# Patient Record
Sex: FEMALE | Race: WHITE | NOT HISPANIC OR LATINO | Employment: OTHER | URBAN - METROPOLITAN AREA
[De-identification: names, ages, dates, MRNs, and addresses within clinical notes are randomized per-mention and may not be internally consistent; named-entity substitution may affect disease eponyms.]

---

## 2017-01-26 ENCOUNTER — HISTORICAL (OUTPATIENT)
Dept: LAB | Facility: HOSPITAL | Age: 67
End: 2017-01-26

## 2017-04-24 ENCOUNTER — HISTORICAL (OUTPATIENT)
Dept: LAB | Facility: HOSPITAL | Age: 67
End: 2017-04-24

## 2017-05-23 ENCOUNTER — HISTORICAL (OUTPATIENT)
Dept: LAB | Facility: HOSPITAL | Age: 67
End: 2017-05-23

## 2017-05-23 LAB
BILIRUB SERPL-MCNC: NEGATIVE MG/DL
BLOOD URINE, POC: NEGATIVE
CLARITY, POC UA: CLEAR
COLOR, POC UA: YELLOW
GLUCOSE UR QL STRIP: NEGATIVE
KETONES UR QL STRIP: NEGATIVE
LEUKOCYTE EST, POC UA: NORMAL
NITRITE, POC UA: NEGATIVE
PH, POC UA: 5
PROTEIN, POC: NEGATIVE
SPECIFIC GRAVITY, POC UA: 1.02
UROBILINOGEN, POC UA: NORMAL

## 2017-05-25 LAB — CRC RECOMMENDATION EXT: NORMAL

## 2017-08-08 LAB
BUN SERPL-MCNC: 200 MG/DL (ref 7–18)
CALCIUM SERPL-MCNC: 8.9 MG/DL (ref 9–10.9)
CHLORIDE SERPL-SCNC: 101 MMOL/L (ref 98–116)
CO2 SERPL-SCNC: 28 MMOL/L (ref 15–28)
CREAT SERPL-MCNC: 0.9 MG/DL (ref 0.6–1.3)
GLUCOSE SERPL-MCNC: 87 MG/DL (ref 74–106)
POTASSIUM SERPL-SCNC: 4.5 MMOL/L (ref 3.5–5.1)
SODIUM SERPL-SCNC: 143 MEQ/L (ref 131–145)

## 2017-08-22 ENCOUNTER — HISTORICAL (OUTPATIENT)
Dept: LAB | Facility: HOSPITAL | Age: 67
End: 2017-08-22

## 2017-08-22 LAB — TSH SERPL-ACNC: 0.31 MIU/ML (ref 0.35–3.75)

## 2017-09-01 ENCOUNTER — HISTORICAL (OUTPATIENT)
Dept: RADIOLOGY | Facility: HOSPITAL | Age: 67
End: 2017-09-01

## 2017-09-11 ENCOUNTER — HISTORICAL (OUTPATIENT)
Dept: RADIOLOGY | Facility: HOSPITAL | Age: 67
End: 2017-09-11

## 2017-10-10 ENCOUNTER — HISTORICAL (OUTPATIENT)
Dept: RADIOLOGY | Facility: HOSPITAL | Age: 67
End: 2017-10-10

## 2017-10-10 LAB
ALBUMIN SERPL-MCNC: 3.5 GM/DL (ref 3.4–5)
ALBUMIN/GLOB SERPL: 1 RATIO (ref 1.1–2)
ALP SERPL-CCNC: 90 UNIT/L (ref 50–136)
ALT SERPL-CCNC: 18 UNIT/L (ref 12–78)
AST SERPL-CCNC: 11 UNIT/L (ref 10–37)
BILIRUB SERPL-MCNC: 0.5 MG/DL (ref 0.2–1)
BILIRUBIN DIRECT+TOT PNL SERPL-MCNC: 0.09 MG/DL (ref 0.05–0.2)
BILIRUBIN DIRECT+TOT PNL SERPL-MCNC: 0.43 MG/DL
BUN SERPL-MCNC: 16 MG/DL (ref 7–18)
CALCIUM SERPL-MCNC: 8.7 MG/DL (ref 8.5–10.1)
CHLORIDE SERPL-SCNC: 106 MMOL/L (ref 98–107)
CHOLEST SERPL-MCNC: 163 MG/DL (ref 50–200)
CHOLEST/HDLC SERPL: 3 {RATIO} (ref 0–5)
CO2 SERPL-SCNC: 30.5 MMOL/L (ref 21–32)
CREAT SERPL-MCNC: 0.87 MG/DL (ref 0.55–1.02)
GLOBULIN SER-MCNC: 3.6 GM/DL (ref 2.4–3.5)
GLUCOSE SERPL-MCNC: 91 MG/DL (ref 74–106)
HDLC SERPL-MCNC: 49 MG/DL (ref 35–60)
LDLC SERPL CALC-MCNC: 70 MG/DL (ref 50–140)
POTASSIUM SERPL-SCNC: 4.6 MMOL/L (ref 3.5–5.1)
PROT SERPL-MCNC: 7.1 GM/DL (ref 6.4–8.2)
SODIUM SERPL-SCNC: 143 MMOL/L (ref 136–145)
TRIGL SERPL-MCNC: 220 MG/DL (ref 30–150)
TSH SERPL-ACNC: 0.31 MIU/ML (ref 0.35–3.75)
VLDLC SERPL CALC-MCNC: 44 MG/DL

## 2017-11-13 LAB
BILIRUB SERPL-MCNC: NEGATIVE MG/DL
BLOOD URINE, POC: NEGATIVE
CLARITY, POC UA: CLEAR
COLOR, POC UA: YELLOW
GLUCOSE UR QL STRIP: NEGATIVE
KETONES UR QL STRIP: NEGATIVE
LEUKOCYTE EST, POC UA: NORMAL
NITRITE, POC UA: NEGATIVE
PH, POC UA: 5
PROTEIN, POC: NEGATIVE
SPECIFIC GRAVITY, POC UA: 1
UROBILINOGEN, POC UA: NORMAL

## 2017-11-16 ENCOUNTER — HISTORICAL (OUTPATIENT)
Dept: LAB | Facility: HOSPITAL | Age: 67
End: 2017-11-16

## 2017-11-16 LAB — TSH SERPL-ACNC: 10.45 MIU/ML (ref 0.35–3.75)

## 2017-11-18 LAB — FINAL CULTURE: NO GROWTH

## 2018-01-05 ENCOUNTER — HISTORICAL (OUTPATIENT)
Dept: LAB | Facility: HOSPITAL | Age: 68
End: 2018-01-05

## 2018-01-05 LAB
ABS NEUT (OLG): 3.94
BASOPHILS # BLD AUTO: 0.06 X10(3)/MCL
BASOPHILS NFR BLD AUTO: 1 %
EOSINOPHIL # BLD AUTO: 0.04 X10(3)/MCL
EOSINOPHIL NFR BLD AUTO: 0.7 %
ERYTHROCYTE [DISTWIDTH] IN BLOOD BY AUTOMATED COUNT: 16 %
HCT VFR BLD AUTO: 34.2 % (ref 34–46)
HGB BLD-MCNC: 10.7 GM/DL (ref 11.3–15.4)
IMM GRANULOCYTES # BLD AUTO: 0.01 10*3/UL (ref 0–0.1)
IMM GRANULOCYTES NFR BLD AUTO: 0.2 % (ref 0–1)
LYMPHOCYTES # BLD AUTO: 1.55 X10(3)/MCL
LYMPHOCYTES NFR BLD AUTO: 25.5 %
MCH RBC QN AUTO: 18.7 PG (ref 27–33)
MCHC RBC AUTO-ENTMCNC: 31.3 GM/DL (ref 32–35)
MCV RBC AUTO: 59.7 FL (ref 81–97)
MONOCYTES # BLD AUTO: 0.48 X10(3)/MCL
MONOCYTES NFR BLD AUTO: 7.9 %
NEUTROPHILS # BLD AUTO: 3.94 X10(3)/MCL
NEUTROPHILS NFR BLD AUTO: 64.7 %
PLATELET # BLD AUTO: 354 X10(3)/MCL (ref 151–368)
PMV BLD AUTO: 11 FL
RBC # BLD AUTO: 5.73 X10(6)/MCL (ref 3.9–5)
TSH SERPL-ACNC: 1.21 MIU/ML (ref 0.35–3.75)
WBC # SPEC AUTO: 6.08 X10(3)/MCL (ref 3.4–9.2)

## 2018-02-20 ENCOUNTER — HISTORICAL (OUTPATIENT)
Dept: LAB | Facility: HOSPITAL | Age: 68
End: 2018-02-20

## 2018-02-20 LAB — TSH SERPL-ACNC: 2.81 MIU/ML (ref 0.35–3.75)

## 2018-04-16 ENCOUNTER — HISTORICAL (OUTPATIENT)
Dept: LAB | Facility: HOSPITAL | Age: 68
End: 2018-04-16

## 2018-04-16 LAB
BILIRUB SERPL-MCNC: NEGATIVE MG/DL
BLOOD URINE, POC: NORMAL
CLARITY, POC UA: CLEAR
COLOR, POC UA: YELLOW
GLUCOSE UR QL STRIP: NEGATIVE
KETONES UR QL STRIP: NEGATIVE
LEUKOCYTE EST, POC UA: NORMAL
NITRITE, POC UA: NEGATIVE
PH, POC UA: 5
PROTEIN, POC: NEGATIVE
SPECIFIC GRAVITY, POC UA: 1.01
UROBILINOGEN, POC UA: NORMAL

## 2018-06-14 ENCOUNTER — HISTORICAL (OUTPATIENT)
Dept: LAB | Facility: HOSPITAL | Age: 68
End: 2018-06-14

## 2018-06-14 LAB
BILIRUB SERPL-MCNC: NEGATIVE MG/DL
BLOOD URINE, POC: NEGATIVE
CLARITY, POC UA: CLEAR
COLOR, POC UA: COLORLESS
GLUCOSE UR QL STRIP: NEGATIVE
KETONES UR QL STRIP: NEGATIVE
LEUKOCYTE EST, POC UA: NORMAL
NITRITE, POC UA: NEGATIVE
PH, POC UA: 6
PROTEIN, POC: NEGATIVE
SPECIFIC GRAVITY, POC UA: 1
UROBILINOGEN, POC UA: NORMAL

## 2018-07-03 ENCOUNTER — HISTORICAL (OUTPATIENT)
Dept: LAB | Facility: HOSPITAL | Age: 68
End: 2018-07-03

## 2018-07-03 LAB — TSH SERPL-ACNC: 0.62 MIU/ML (ref 0.35–3.75)

## 2018-07-17 ENCOUNTER — HISTORICAL (OUTPATIENT)
Dept: RESPIRATORY THERAPY | Facility: HOSPITAL | Age: 68
End: 2018-07-17

## 2018-07-27 ENCOUNTER — HISTORICAL (OUTPATIENT)
Dept: RADIOLOGY | Facility: HOSPITAL | Age: 68
End: 2018-07-27

## 2018-07-27 LAB
FT4I SERPL CALC-MCNC: 5 (ref 3–4)
T3FREE SERPL-MCNC: 2.76 PG/ML (ref 2.18–3.98)
T3RU NFR SERPL: 33 % (ref 31–39)
T4 FREE SERPL-MCNC: 1.25 NG/DL (ref 0.76–1.46)
T4 SERPL-MCNC: 14.9 MCG/DL (ref 4.7–13.3)
TSH SERPL-ACNC: 0.85 MIU/L (ref 0.36–3.74)

## 2018-08-20 LAB
BILIRUB SERPL-MCNC: NEGATIVE MG/DL
BLOOD URINE, POC: NORMAL
CLARITY, POC UA: CLEAR
COLOR, POC UA: YELLOW
GLUCOSE UR QL STRIP: NEGATIVE
KETONES UR QL STRIP: NEGATIVE
LEUKOCYTE EST, POC UA: NORMAL
NITRITE, POC UA: NEGATIVE
PH, POC UA: 5
PROTEIN, POC: NEGATIVE
SPECIFIC GRAVITY, POC UA: 1.02
UROBILINOGEN, POC UA: NORMAL

## 2018-09-13 ENCOUNTER — HISTORICAL (OUTPATIENT)
Dept: LAB | Facility: HOSPITAL | Age: 68
End: 2018-09-13

## 2018-09-13 LAB
ABS NEUT (OLG): 3.94
ALBUMIN SERPL-MCNC: 3.4 GM/DL (ref 3.4–5)
ALBUMIN/GLOB SERPL: 1 RATIO (ref 1.1–2)
ALP SERPL-CCNC: 82 UNIT/L (ref 46–116)
ALT SERPL-CCNC: 15 UNIT/L (ref 12–78)
AST SERPL-CCNC: 12 UNIT/L (ref 10–37)
BASOPHILS # BLD AUTO: 0.08 X10(3)/MCL
BASOPHILS NFR BLD AUTO: 1.4 %
BILIRUB SERPL-MCNC: 0.6 MG/DL (ref 0.2–1)
BILIRUBIN DIRECT+TOT PNL SERPL-MCNC: 0.12 MG/DL (ref 0–0.2)
BILIRUBIN DIRECT+TOT PNL SERPL-MCNC: 0.48 MG/DL
BUN SERPL-MCNC: 17 MG/DL (ref 7–18)
CALCIUM SERPL-MCNC: 8.4 MG/DL (ref 8.5–10.1)
CHLORIDE SERPL-SCNC: 105 MMOL/L (ref 98–107)
CO2 SERPL-SCNC: 28.7 MMOL/L (ref 21–32)
CREAT SERPL-MCNC: 0.88 MG/DL (ref 0.55–1.02)
EOSINOPHIL # BLD AUTO: 0.1 X10(3)/MCL
EOSINOPHIL NFR BLD AUTO: 1.7 %
ERYTHROCYTE [DISTWIDTH] IN BLOOD BY AUTOMATED COUNT: 18 %
ERYTHROCYTE [SEDIMENTATION RATE] IN BLOOD: 23 MM/HR (ref 0–20)
GLOBULIN SER-MCNC: 3.5 GM/DL (ref 2.4–3.5)
GLUCOSE SERPL-MCNC: 82 MG/DL (ref 74–106)
HCT VFR BLD AUTO: 35.7 % (ref 34–46)
HGB BLD-MCNC: 11.1 GM/DL (ref 11.3–15.4)
IMM GRANULOCYTES # BLD AUTO: 0.02 10*3/UL (ref 0–0.1)
IMM GRANULOCYTES NFR BLD AUTO: 0.3 % (ref 0–1)
LYMPHOCYTES # BLD AUTO: 1.16 X10(3)/MCL
LYMPHOCYTES NFR BLD AUTO: 20.2 %
MAGNESIUM SERPL-MCNC: 1.9 MG/DL (ref 1.8–2.4)
MCH RBC QN AUTO: 18.4 PG (ref 27–33)
MCHC RBC AUTO-ENTMCNC: 31.1 GM/DL (ref 32–35)
MCV RBC AUTO: 59.1 FL (ref 81–97)
MONOCYTES # BLD AUTO: 0.43 X10(3)/MCL
MONOCYTES NFR BLD AUTO: 7.5 %
NEUTROPHILS # BLD AUTO: 3.94 X10(3)/MCL
NEUTROPHILS NFR BLD AUTO: 68.9 %
PLATELET # BLD AUTO: 377 X10(3)/MCL (ref 151–368)
PMV BLD AUTO: 10 FL
POTASSIUM SERPL-SCNC: 4.6 MMOL/L (ref 3.5–5.1)
PROT SERPL-MCNC: 6.9 GM/DL (ref 6.4–8.2)
RBC # BLD AUTO: 6.04 X10(6)/MCL (ref 3.9–5)
SODIUM SERPL-SCNC: 142 MMOL/L (ref 136–145)
WBC # SPEC AUTO: 5.73 X10(3)/MCL (ref 3.4–9.2)

## 2018-10-05 ENCOUNTER — HISTORICAL (OUTPATIENT)
Dept: LAB | Facility: HOSPITAL | Age: 68
End: 2018-10-05

## 2018-10-07 LAB — FINAL CULTURE: NO GROWTH

## 2018-11-01 ENCOUNTER — HISTORICAL (OUTPATIENT)
Dept: LAB | Facility: HOSPITAL | Age: 68
End: 2018-11-01

## 2018-11-03 LAB — FINAL CULTURE: NORMAL

## 2018-12-14 ENCOUNTER — HISTORICAL (OUTPATIENT)
Dept: LAB | Facility: HOSPITAL | Age: 68
End: 2018-12-14

## 2018-12-14 LAB
ABS NEUT (OLG): 5.15
ALBUMIN SERPL-MCNC: 3.4 GM/DL (ref 3.4–5)
ALBUMIN/GLOB SERPL: 0.9 RATIO (ref 1.1–2)
ALP SERPL-CCNC: 113 UNIT/L (ref 46–116)
ALT SERPL-CCNC: 23 UNIT/L (ref 12–78)
AST SERPL-CCNC: 18 UNIT/L (ref 10–37)
BASOPHILS # BLD AUTO: 0.1 X10(3)/MCL
BASOPHILS NFR BLD AUTO: 1.4 %
BILIRUB SERPL-MCNC: 0.3 MG/DL (ref 0.2–1)
BILIRUBIN DIRECT+TOT PNL SERPL-MCNC: 0.07 MG/DL (ref 0–0.2)
BILIRUBIN DIRECT+TOT PNL SERPL-MCNC: 0.23 MG/DL
BUN SERPL-MCNC: 17 MG/DL (ref 7–18)
CALCIUM SERPL-MCNC: 8.3 MG/DL (ref 8.5–10.1)
CHLORIDE SERPL-SCNC: 105 MMOL/L (ref 98–107)
CHOLEST SERPL-MCNC: 201 MG/DL (ref 50–200)
CHOLEST/HDLC SERPL: 5 {RATIO} (ref 0–5)
CO2 SERPL-SCNC: 31.2 MMOL/L (ref 21–32)
CREAT SERPL-MCNC: 1.01 MG/DL (ref 0.55–1.02)
EOSINOPHIL # BLD AUTO: 0.11 X10(3)/MCL
EOSINOPHIL NFR BLD AUTO: 1.5 %
ERYTHROCYTE [DISTWIDTH] IN BLOOD BY AUTOMATED COUNT: 16 %
GLOBULIN SER-MCNC: 3.6 GM/DL (ref 2.4–3.5)
GLUCOSE SERPL-MCNC: 94 MG/DL (ref 74–106)
HCT VFR BLD AUTO: 36.6 % (ref 34–46)
HDLC SERPL-MCNC: 41 MG/DL (ref 35–60)
HGB BLD-MCNC: 11 GM/DL (ref 11.3–15.4)
HYPOCHROMIA BLD QL SMEAR: ABNORMAL
IMM GRANULOCYTES # BLD AUTO: 0.03 10*3/UL (ref 0–0.1)
IMM GRANULOCYTES NFR BLD AUTO: 0.4 % (ref 0–1)
LDLC SERPL CALC-MCNC: ABNORMAL MG/DL (ref 50–140)
LYMPHOCYTES # BLD AUTO: 1.4 X10(3)/MCL
LYMPHOCYTES NFR BLD AUTO: 19.3 %
MCH RBC QN AUTO: 18.3 PG (ref 27–33)
MCHC RBC AUTO-ENTMCNC: 30.1 GM/DL (ref 32–35)
MCV RBC AUTO: 60.9 FL (ref 81–97)
MICROCYTES BLD QL SMEAR: ABNORMAL
MONOCYTES # BLD AUTO: 0.48 X10(3)/MCL
MONOCYTES NFR BLD AUTO: 6.6 %
NEUTROPHILS # BLD AUTO: 5.15 X10(3)/MCL
NEUTROPHILS NFR BLD AUTO: 70.8 %
PLATELET # BLD AUTO: 364 X10(3)/MCL (ref 151–368)
PLATELET # BLD EST: ABNORMAL 10*3/UL
PMV BLD AUTO: 10 FL
POTASSIUM SERPL-SCNC: 4.6 MMOL/L (ref 3.5–5.1)
PROT SERPL-MCNC: 7 GM/DL (ref 6.4–8.2)
RBC # BLD AUTO: 6.01 X10(6)/MCL (ref 3.9–5)
SODIUM SERPL-SCNC: 141 MMOL/L (ref 136–145)
TRIGL SERPL-MCNC: 437 MG/DL (ref 30–150)
TSH SERPL-ACNC: 21.43 MIU/ML (ref 0.35–3.75)
VLDLC SERPL CALC-MCNC: 87 MG/DL
WBC # SPEC AUTO: 7.27 X10(3)/MCL (ref 3.4–9.2)

## 2019-02-06 ENCOUNTER — HISTORICAL (OUTPATIENT)
Dept: LAB | Facility: HOSPITAL | Age: 69
End: 2019-02-06

## 2019-02-06 LAB
T3FREE SERPL-MCNC: 2.61 PG/ML (ref 2.18–3.98)
TSH SERPL-ACNC: 0.15 MIU/ML (ref 0.35–3.75)

## 2019-04-01 ENCOUNTER — HISTORICAL (OUTPATIENT)
Dept: LAB | Facility: HOSPITAL | Age: 69
End: 2019-04-01

## 2019-04-01 LAB — TSH SERPL-ACNC: 0.09 MIU/ML (ref 0.35–3.75)

## 2019-07-03 ENCOUNTER — HISTORICAL (OUTPATIENT)
Dept: LAB | Facility: HOSPITAL | Age: 69
End: 2019-07-03

## 2019-07-03 LAB
BILIRUB SERPL-MCNC: NEGATIVE MG/DL
BLOOD URINE, POC: NORMAL
CLARITY, POC UA: CLEAR
COLOR, POC UA: YELLOW
GLUCOSE UR QL STRIP: NEGATIVE
KETONES UR QL STRIP: NEGATIVE
LEUKOCYTE EST, POC UA: NORMAL
NITRITE, POC UA: NEGATIVE
PH, POC UA: 5
PROTEIN, POC: NEGATIVE
SPECIFIC GRAVITY, POC UA: 1.02
TSH SERPL-ACNC: 0.47 MIU/ML (ref 0.35–3.75)
UROBILINOGEN, POC UA: NORMAL

## 2019-08-27 ENCOUNTER — HISTORICAL (OUTPATIENT)
Dept: LAB | Facility: HOSPITAL | Age: 69
End: 2019-08-27

## 2019-08-27 LAB
BILIRUB SERPL-MCNC: NEGATIVE MG/DL
BLOOD URINE, POC: NEGATIVE
CLARITY, POC UA: CLEAR
COLOR, POC UA: YELLOW
GLUCOSE UR QL STRIP: NEGATIVE
KETONES UR QL STRIP: NEGATIVE
LEUKOCYTE EST, POC UA: NORMAL
NITRITE, POC UA: NEGATIVE
PH, POC UA: 5
PROTEIN, POC: NEGATIVE
SPECIFIC GRAVITY, POC UA: 1
TSH SERPL-ACNC: 15.71 MIU/ML (ref 0.35–3.75)
UROBILINOGEN, POC UA: NORMAL

## 2019-08-29 LAB — FINAL CULTURE: NO GROWTH

## 2019-10-01 ENCOUNTER — HISTORICAL (OUTPATIENT)
Dept: LAB | Facility: HOSPITAL | Age: 69
End: 2019-10-01

## 2019-10-01 LAB
BILIRUB SERPL-MCNC: NEGATIVE MG/DL
BLOOD URINE, POC: NORMAL
CLARITY, POC UA: CLEAR
COLOR, POC UA: COLORLESS
GLUCOSE UR QL STRIP: NEGATIVE
KETONES UR QL STRIP: NEGATIVE
LEUKOCYTE EST, POC UA: NORMAL
NITRITE, POC UA: NEGATIVE
PH, POC UA: 5
PROTEIN, POC: NEGATIVE
SPECIFIC GRAVITY, POC UA: 1.01
UROBILINOGEN, POC UA: NORMAL

## 2019-10-02 ENCOUNTER — HISTORICAL (OUTPATIENT)
Dept: LAB | Facility: HOSPITAL | Age: 69
End: 2019-10-02

## 2019-10-02 LAB — TSH SERPL-ACNC: 3.73 MIU/ML (ref 0.35–3.75)

## 2019-12-04 ENCOUNTER — HISTORICAL (OUTPATIENT)
Dept: RADIOLOGY | Facility: HOSPITAL | Age: 69
End: 2019-12-04

## 2019-12-04 LAB
BILIRUB SERPL-MCNC: NEGATIVE MG/DL
BLOOD URINE, POC: NORMAL
CLARITY, POC UA: CLEAR
COLOR, POC UA: YELLOW
GLUCOSE UR QL STRIP: NEGATIVE
KETONES UR QL STRIP: NEGATIVE
LEUKOCYTE EST, POC UA: NORMAL
NITRITE, POC UA: NEGATIVE
PH, POC UA: 5
PROTEIN, POC: NEGATIVE
SPECIFIC GRAVITY, POC UA: 1.01
TSH SERPL-ACNC: 0.36 UIU/ML (ref 0.35–4.94)
UROBILINOGEN, POC UA: NORMAL

## 2019-12-07 LAB — FINAL CULTURE: NO GROWTH

## 2020-01-15 ENCOUNTER — HISTORICAL (OUTPATIENT)
Dept: LAB | Facility: HOSPITAL | Age: 70
End: 2020-01-15

## 2020-01-18 LAB — FINAL CULTURE: NORMAL

## 2020-01-23 ENCOUNTER — HISTORICAL (OUTPATIENT)
Dept: RADIOLOGY | Facility: HOSPITAL | Age: 70
End: 2020-01-23

## 2020-01-23 LAB
ABS NEUT (OLG): 5.09
ALBUMIN SERPL-MCNC: 3.4 GM/DL (ref 3.2–4.6)
ALBUMIN/GLOB SERPL: 1.1 RATIO (ref 1.1–2)
ALP SERPL-CCNC: 76 UNIT/L (ref 40–150)
ALT SERPL-CCNC: 15 UNIT/L (ref 0–55)
ANISOCYTOSIS BLD QL SMEAR: ABNORMAL
AST SERPL-CCNC: 18 UNIT/L (ref 5–34)
BASOPHILS # BLD AUTO: 0.08 X10(3)/MCL
BASOPHILS NFR BLD AUTO: 1 %
BILIRUB SERPL-MCNC: 0.6 MG/DL
BILIRUBIN DIRECT+TOT PNL SERPL-MCNC: 0.2 MG/DL (ref 0–0.5)
BILIRUBIN DIRECT+TOT PNL SERPL-MCNC: 0.4 MG/DL
BUN SERPL-MCNC: 17 MG/DL (ref 9.8–20.1)
CALCIUM SERPL-MCNC: 8.4 MG/DL (ref 8.4–10.2)
CHLORIDE SERPL-SCNC: 109 MMOL/L (ref 98–107)
CHOLEST SERPL-MCNC: 148 MG/DL
CHOLEST/HDLC SERPL: 4 {RATIO} (ref 0–5)
CO2 SERPL-SCNC: 27 MEQ/L (ref 23–31)
CREAT SERPL-MCNC: 0.78 MG/DL (ref 0.55–1.02)
EOSINOPHIL # BLD AUTO: 0.19 X10(3)/MCL
EOSINOPHIL NFR BLD AUTO: 2.5 %
ERYTHROCYTE [DISTWIDTH] IN BLOOD BY AUTOMATED COUNT: 16 %
GLOBULIN SER-MCNC: 3.1 GM/DL (ref 2.4–3.5)
GLUCOSE SERPL-MCNC: 87 MG/DL (ref 82–115)
HCT VFR BLD AUTO: 34.2 % (ref 34–46)
HDLC SERPL-MCNC: 42 MG/DL
HGB BLD-MCNC: 10.2 GM/DL (ref 11.3–15.4)
HYPOCHROMIA BLD QL SMEAR: ABNORMAL
IMM GRANULOCYTES # BLD AUTO: 0.04 10*3/UL (ref 0–0.1)
IMM GRANULOCYTES NFR BLD AUTO: 0.5 % (ref 0–1)
LDLC SERPL CALC-MCNC: 79 MG/DL (ref 50–140)
LYMPHOCYTES # BLD AUTO: 1.76 X10(3)/MCL
LYMPHOCYTES NFR BLD AUTO: 22.9 %
MACROCYTES BLD QL SMEAR: SLIGHT
MCH RBC QN AUTO: 18.6 PG (ref 27–33)
MCHC RBC AUTO-ENTMCNC: 29.8 GM/DL (ref 32–35)
MCV RBC AUTO: 62.3 FL (ref 81–97)
MICROCYTES BLD QL SMEAR: ABNORMAL
MONOCYTES # BLD AUTO: 0.51 X10(3)/MCL
MONOCYTES NFR BLD AUTO: 6.6 %
NEUTROPHILS # BLD AUTO: 5.09 X10(3)/MCL
NEUTROPHILS NFR BLD AUTO: 66.5 %
OVALOCYTES BLD QL SMEAR: ABNORMAL
PLATELET # BLD AUTO: 446 X10(3)/MCL (ref 140–450)
PLATELET # BLD EST: ABNORMAL 10*3/UL
PMV BLD AUTO: 10 FL
POIKILOCYTOSIS BLD QL SMEAR: ABNORMAL
POLYCHROMASIA BLD QL SMEAR: SLIGHT
POTASSIUM SERPL-SCNC: 4.6 MMOL/L (ref 3.5–5.1)
PROT SERPL-MCNC: 6.5 GM/DL (ref 5.8–7.6)
RBC # BLD AUTO: 5.49 X10(6)/MCL (ref 3.9–5)
SODIUM SERPL-SCNC: 145 MMOL/L (ref 136–145)
SPHEROCYTES BLD QL SMEAR: SLIGHT
TARGETS BLD QL SMEAR: SLIGHT
TRIGL SERPL-MCNC: 133 MG/DL (ref 37–140)
TSH SERPL-ACNC: 1.77 UIU/ML (ref 0.35–4.94)
VLDLC SERPL CALC-MCNC: 27 MG/DL
WBC # SPEC AUTO: 7.67 X10(3)/MCL (ref 3.4–9.2)

## 2020-03-12 LAB
BILIRUB SERPL-MCNC: NEGATIVE MG/DL
BLOOD URINE, POC: NEGATIVE
CLARITY, POC UA: CLEAR
COLOR, POC UA: COLORLESS
GLUCOSE UR QL STRIP: NEGATIVE
KETONES UR QL STRIP: NEGATIVE
LEUKOCYTE EST, POC UA: NEGATIVE
NITRITE, POC UA: NEGATIVE
PH, POC UA: 5
PROTEIN, POC: NEGATIVE
SPECIFIC GRAVITY, POC UA: 1.01
UROBILINOGEN, POC UA: NORMAL

## 2020-05-26 ENCOUNTER — HISTORICAL (OUTPATIENT)
Dept: LAB | Facility: HOSPITAL | Age: 70
End: 2020-05-26

## 2020-05-26 LAB — TSH SERPL-ACNC: 1.9 UIU/ML (ref 0.35–4.94)

## 2020-05-29 ENCOUNTER — HISTORICAL (OUTPATIENT)
Dept: LAB | Facility: HOSPITAL | Age: 70
End: 2020-05-29

## 2020-05-29 LAB
T3RU NFR SERPL: 30.09 % (ref 31–39)
T4 FREE SERPL-MCNC: 1.15 NG/DL (ref 0.7–1.48)

## 2020-09-10 ENCOUNTER — HISTORICAL (OUTPATIENT)
Dept: LAB | Facility: HOSPITAL | Age: 70
End: 2020-09-10

## 2020-09-13 LAB — FINAL CULTURE: NORMAL

## 2020-09-28 ENCOUNTER — HISTORICAL (OUTPATIENT)
Dept: LAB | Facility: HOSPITAL | Age: 70
End: 2020-09-28

## 2020-09-28 LAB
EST. AVERAGE GLUCOSE BLD GHB EST-MCNC: 97 MG/DL
HBA1C MFR BLD: 5 % (ref 4–6)

## 2021-01-25 ENCOUNTER — HISTORICAL (OUTPATIENT)
Dept: LAB | Facility: HOSPITAL | Age: 71
End: 2021-01-25

## 2021-01-25 LAB
ABS NEUT (OLG): 4.56
ALBUMIN SERPL-MCNC: 3.7 GM/DL (ref 3.4–4.8)
ALBUMIN/GLOB SERPL: 1.1 RATIO (ref 1.1–2)
ALP SERPL-CCNC: 79 UNIT/L (ref 40–150)
ALT SERPL-CCNC: 9 UNIT/L (ref 0–55)
ANISOCYTOSIS BLD QL SMEAR: ABNORMAL
AST SERPL-CCNC: 15 UNIT/L (ref 5–34)
BASOPHILS # BLD AUTO: 0.06 X10(3)/MCL
BASOPHILS NFR BLD AUTO: 0.9 %
BILIRUB SERPL-MCNC: 0.6 MG/DL
BILIRUBIN DIRECT+TOT PNL SERPL-MCNC: 0.2 MG/DL (ref 0–0.5)
BILIRUBIN DIRECT+TOT PNL SERPL-MCNC: 0.4 MG/DL
BUN SERPL-MCNC: 18 MG/DL (ref 9.8–20.1)
CALCIUM SERPL-MCNC: 8.2 MG/DL (ref 8.4–10.2)
CHLORIDE SERPL-SCNC: 108 MMOL/L (ref 98–107)
CHOLEST SERPL-MCNC: 167 MG/DL
CHOLEST/HDLC SERPL: 4 {RATIO} (ref 0–5)
CO2 SERPL-SCNC: 28 MEQ/L (ref 23–31)
CREAT SERPL-MCNC: 0.8 MG/DL (ref 0.55–1.02)
DACRYOCYTES BLD QL SMEAR: SLIGHT /MCL (ref 0–1)
EOSINOPHIL # BLD AUTO: 0.01 X10(3)/MCL
EOSINOPHIL NFR BLD AUTO: 0.2 %
ERYTHROCYTE [DISTWIDTH] IN BLOOD BY AUTOMATED COUNT: 16 %
GLOBULIN SER-MCNC: 3.3 GM/DL (ref 2.4–3.5)
GLUCOSE SERPL-MCNC: 88 MG/DL (ref 82–115)
HCT VFR BLD AUTO: 36 % (ref 34–46)
HDLC SERPL-MCNC: 42 MG/DL (ref 35–60)
HGB BLD-MCNC: 11 GM/DL (ref 11.3–15.4)
HYPOCHROMIA BLD QL SMEAR: ABNORMAL
IMM GRANULOCYTES # BLD AUTO: 0.02 10*3/UL (ref 0–0.1)
IMM GRANULOCYTES NFR BLD AUTO: 0.3 % (ref 0–1)
LDLC SERPL CALC-MCNC: 69 MG/DL (ref 50–140)
LYMPHOCYTES # BLD AUTO: 1.48 X10(3)/MCL
LYMPHOCYTES NFR BLD AUTO: 22.5 %
MCH RBC QN AUTO: 19 PG (ref 27–33)
MCHC RBC AUTO-ENTMCNC: 30.6 GM/DL (ref 32–35)
MCV RBC AUTO: 62.1 FL (ref 81–97)
MICROCYTES BLD QL SMEAR: ABNORMAL
MONOCYTES # BLD AUTO: 0.45 X10(3)/MCL
MONOCYTES NFR BLD AUTO: 6.8 %
NEUTROPHILS # BLD AUTO: 4.56 X10(3)/MCL
NEUTROPHILS NFR BLD AUTO: 69.3 %
OVALOCYTES BLD QL SMEAR: SLIGHT
PLATELET # BLD AUTO: 401 X10(3)/MCL (ref 140–450)
PLATELET # BLD EST: ABNORMAL 10*3/UL
PMV BLD AUTO: 10 FL
POIKILOCYTOSIS BLD QL SMEAR: SLIGHT
POTASSIUM SERPL-SCNC: 4.4 MMOL/L (ref 3.5–5.1)
PROT SERPL-MCNC: 7 GM/DL (ref 5.8–7.6)
RBC # BLD AUTO: 5.8 X10(6)/MCL (ref 3.9–5)
SODIUM SERPL-SCNC: 146 MMOL/L (ref 136–145)
TARGETS BLD QL SMEAR: ABNORMAL
TRIGL SERPL-MCNC: 281 MG/DL (ref 37–140)
TSH SERPL-ACNC: 6.97 UIU/ML (ref 0.35–4.94)
VLDLC SERPL CALC-MCNC: 56 MG/DL
WBC # SPEC AUTO: 6.58 X10(3)/MCL (ref 3.4–9.2)

## 2021-01-27 LAB
BILIRUB SERPL-MCNC: NEGATIVE MG/DL
BLOOD URINE, POC: NEGATIVE
CLARITY, POC UA: CLEAR
COLOR, POC UA: YELLOW
GLUCOSE UR QL STRIP: NEGATIVE
KETONES UR QL STRIP: NEGATIVE
LEUKOCYTE EST, POC UA: NEGATIVE
NITRITE, POC UA: NEGATIVE
PH, POC UA: 5
PROTEIN, POC: NEGATIVE
SPECIFIC GRAVITY, POC UA: 1.01
UROBILINOGEN, POC UA: NORMAL

## 2021-02-11 ENCOUNTER — HISTORICAL (OUTPATIENT)
Dept: RADIOLOGY | Facility: HOSPITAL | Age: 71
End: 2021-02-11

## 2021-03-12 ENCOUNTER — HISTORICAL (OUTPATIENT)
Dept: LAB | Facility: HOSPITAL | Age: 71
End: 2021-03-12

## 2021-03-12 LAB — TSH SERPL-ACNC: 0.16 UIU/ML (ref 0.35–4.94)

## 2021-05-10 ENCOUNTER — HISTORICAL (OUTPATIENT)
Dept: LAB | Facility: HOSPITAL | Age: 71
End: 2021-05-10

## 2021-05-10 LAB — TSH SERPL-ACNC: 0.43 UIU/ML (ref 0.35–4.94)

## 2021-07-01 ENCOUNTER — HISTORICAL (OUTPATIENT)
Dept: LAB | Facility: HOSPITAL | Age: 71
End: 2021-07-01

## 2021-07-02 ENCOUNTER — HISTORICAL (OUTPATIENT)
Dept: LAB | Facility: HOSPITAL | Age: 71
End: 2021-07-02

## 2021-07-02 LAB — TSH SERPL-ACNC: 1.87 UIU/ML (ref 0.35–4.94)

## 2021-07-03 LAB — FINAL CULTURE: NORMAL

## 2021-10-14 ENCOUNTER — HISTORICAL (OUTPATIENT)
Dept: LAB | Facility: HOSPITAL | Age: 71
End: 2021-10-14

## 2021-10-14 LAB — TSH SERPL-ACNC: 1.55 UIU/ML (ref 0.35–4.94)

## 2021-11-01 ENCOUNTER — HISTORICAL (OUTPATIENT)
Dept: LAB | Facility: HOSPITAL | Age: 71
End: 2021-11-01

## 2022-02-22 ENCOUNTER — HISTORICAL (OUTPATIENT)
Dept: LAB | Facility: HOSPITAL | Age: 72
End: 2022-02-22

## 2022-02-22 LAB
ABS NEUT (OLG): 4.24
ALBUMIN SERPL-MCNC: 3.8 G/DL (ref 3.4–4.8)
ALBUMIN/GLOB SERPL: 1.2 {RATIO} (ref 1.1–2)
ALP SERPL-CCNC: 74 U/L (ref 40–150)
ALT SERPL-CCNC: 21 U/L (ref 0–55)
AST SERPL-CCNC: 22 U/L (ref 5–34)
BASOPHILS # BLD AUTO: 0.08 10*3/UL
BASOPHILS NFR BLD AUTO: 1.3 %
BILIRUB SERPL-MCNC: 0.5 MG/DL
BILIRUBIN DIRECT+TOT PNL SERPL-MCNC: 0.2 (ref 0–0.5)
BILIRUBIN DIRECT+TOT PNL SERPL-MCNC: 0.3
BUN SERPL-MCNC: 21 MG/DL (ref 9.8–20.1)
CALCIUM SERPL-MCNC: 8.7 MG/DL (ref 8.7–10.5)
CHLORIDE SERPL-SCNC: 107 MMOL/L (ref 98–107)
CHOLEST SERPL-MCNC: 173 MG/DL
CHOLEST/HDLC SERPL: 4 {RATIO} (ref 0–5)
CO2 SERPL-SCNC: 28 MMOL/L (ref 23–31)
CREAT SERPL-MCNC: 0.88 MG/DL (ref 0.55–1.02)
EOSINOPHIL # BLD AUTO: 0.1 10*3/UL
EOSINOPHIL NFR BLD AUTO: 1.6 %
ERYTHROCYTE [DISTWIDTH] IN BLOOD BY AUTOMATED COUNT: 16 %
GLOBULIN SER-MCNC: 3.1 G/DL (ref 2.4–3.5)
GLUCOSE SERPL-MCNC: 87 MG/DL (ref 82–115)
HCT VFR BLD AUTO: 35.8 % (ref 34–46)
HDLC SERPL-MCNC: 45 MG/DL (ref 35–60)
HEMOLYSIS INTERF INDEX SERPL-ACNC: 1
HGB BLD-MCNC: 10.9 G/DL (ref 11.3–15.4)
ICTERIC INTERF INDEX SERPL-ACNC: 0
IMM GRANULOCYTES # BLD AUTO: 0.01 10*3/UL (ref 0–0.1)
IMM GRANULOCYTES NFR BLD AUTO: 0.2 % (ref 0–1)
LDLC SERPL CALC-MCNC: 83 MG/DL (ref 50–140)
LIPEMIC INTERF INDEX SERPL-ACNC: 1
LYMPHOCYTES # BLD AUTO: 1.38 10*3/UL
LYMPHOCYTES NFR BLD AUTO: 21.9 %
MANUAL DIFF? (OHS): NO
MCH RBC QN AUTO: 19.4 PG (ref 27–33)
MCHC RBC AUTO-ENTMCNC: 30.4 G/DL (ref 32–35)
MCV RBC AUTO: 63.6 FL (ref 81–97)
MONOCYTES # BLD AUTO: 0.48 10*3/UL
MONOCYTES NFR BLD AUTO: 7.6 %
NEUTROPHILS # BLD AUTO: 4.24 10*3/UL
NEUTROPHILS NFR BLD AUTO: 67.4 %
PLATELET # BLD AUTO: 356 10*3/UL (ref 140–450)
PMV BLD AUTO: 11 FL
POTASSIUM SERPL-SCNC: 4.8 MMOL/L (ref 3.5–5.1)
PROT SERPL-MCNC: 6.9 G/DL (ref 5.8–7.6)
RBC # BLD AUTO: 5.63 10*6/UL (ref 3.9–5)
SCAN RECIEVED (OHS): YES
SODIUM SERPL-SCNC: 142 MMOL/L (ref 136–145)
TRIGL SERPL-MCNC: 225 MG/DL (ref 37–140)
TSH SERPL-ACNC: 1.64 M[IU]/L (ref 0.35–4.94)
VLDLC SERPL CALC-MCNC: 45 MG/DL
WBC # SPEC AUTO: 6.29 10*3/UL (ref 3.4–9.2)

## 2022-02-23 ENCOUNTER — HISTORICAL (OUTPATIENT)
Dept: LAB | Facility: HOSPITAL | Age: 72
End: 2022-02-23

## 2022-02-28 ENCOUNTER — HISTORICAL (OUTPATIENT)
Dept: RADIOLOGY | Facility: HOSPITAL | Age: 72
End: 2022-02-28

## 2022-02-28 ENCOUNTER — HISTORICAL (OUTPATIENT)
Dept: ADMINISTRATIVE | Facility: HOSPITAL | Age: 72
End: 2022-02-28

## 2022-03-31 ENCOUNTER — TELEPHONE (OUTPATIENT)
Dept: UROLOGY | Facility: CLINIC | Age: 72
End: 2022-03-31

## 2022-03-31 ENCOUNTER — OFFICE VISIT (OUTPATIENT)
Dept: UROLOGY | Facility: CLINIC | Age: 72
End: 2022-03-31
Payer: MEDICARE

## 2022-03-31 VITALS
HEART RATE: 50 BPM | BODY MASS INDEX: 31.7 KG/M2 | HEIGHT: 58 IN | WEIGHT: 151 LBS | SYSTOLIC BLOOD PRESSURE: 176 MMHG | DIASTOLIC BLOOD PRESSURE: 71 MMHG

## 2022-03-31 DIAGNOSIS — N39.0 RECURRENT UTI: Primary | ICD-10-CM

## 2022-03-31 PROCEDURE — 99203 OFFICE O/P NEW LOW 30 MIN: CPT | Mod: S$GLB,,, | Performed by: UROLOGY

## 2022-03-31 PROCEDURE — 99203 PR OFFICE/OUTPT VISIT, NEW, LEVL III, 30-44 MIN: ICD-10-PCS | Mod: S$GLB,,, | Performed by: UROLOGY

## 2022-03-31 RX ORDER — LEVOTHYROXINE SODIUM 75 UG/1
75 TABLET ORAL DAILY
COMMUNITY
Start: 2022-02-09 | End: 2022-05-11

## 2022-03-31 RX ORDER — AMLODIPINE BESYLATE 10 MG/1
10 TABLET ORAL DAILY
COMMUNITY
Start: 2022-02-28 | End: 2022-07-08 | Stop reason: SDUPTHER

## 2022-03-31 RX ORDER — FAMOTIDINE 20 MG/1
20 TABLET, FILM COATED ORAL 2 TIMES DAILY
COMMUNITY

## 2022-03-31 RX ORDER — GABAPENTIN 300 MG/1
300 CAPSULE ORAL 3 TIMES DAILY
COMMUNITY
Start: 2022-02-28 | End: 2023-01-11

## 2022-03-31 RX ORDER — MELOXICAM 15 MG/1
15 TABLET ORAL DAILY
COMMUNITY
Start: 2022-02-21 | End: 2022-08-12

## 2022-03-31 RX ORDER — LOVASTATIN 20 MG/1
20 TABLET ORAL DAILY
COMMUNITY
Start: 2022-01-10 | End: 2023-02-08

## 2022-03-31 RX ORDER — METOPROLOL TARTRATE 50 MG/1
50 TABLET ORAL 2 TIMES DAILY
COMMUNITY
Start: 2021-12-27 | End: 2022-11-10

## 2022-03-31 RX ORDER — ASPIRIN 81 MG/1
81 TABLET ORAL DAILY
COMMUNITY

## 2022-03-31 RX ORDER — SUCRALFATE 1 G/1
1 TABLET ORAL 4 TIMES DAILY
COMMUNITY

## 2022-03-31 NOTE — TELEPHONE ENCOUNTER
Contacted pt, advised that she should proceed to Dr. Johnson office to sign a medical release form so that her documents can be faxed to our office. Provided fax number. Pt verbalized understanding. JANEE

## 2022-03-31 NOTE — PROGRESS NOTES
Subjective:       Patient ID: Farzaneh Velez is a 71 y.o. female.    Chief Complaint: No chief complaint on file.      HPI:  71-year-old female with recurrent UTIs and weak urinary stream well as nocturia overall she is doing well has not had a urinary tract infection and few weeks today her urinalysis is clean she is fairly asymptomatic    Past Medical History:   Past Medical History:   Diagnosis Date    Anemia     Hypertension     Urinary tract infection        Past Surgical Historical:   Past Surgical History:   Procedure Laterality Date     SECTION      removal of thyroid          Medications:   Medication List with Changes/Refills   Current Medications    AMLODIPINE (NORVASC) 10 MG TABLET    Take 10 mg by mouth once daily.    ASPIRIN (ECOTRIN) 81 MG EC TABLET    Take 81 mg by mouth once daily.    EUTHYROX 75 MCG TABLET    Take 75 mcg by mouth once daily.    FAMOTIDINE (PEPCID) 20 MG TABLET    Take 20 mg by mouth 2 (two) times daily.    GABAPENTIN (NEURONTIN) 300 MG CAPSULE    Take 300 mg by mouth 3 (three) times daily.    LOVASTATIN (MEVACOR) 20 MG TABLET    Take 20 mg by mouth once daily.    MELOXICAM (MOBIC) 15 MG TABLET    Take 15 mg by mouth once daily.    METOPROLOL TARTRATE (LOPRESSOR) 50 MG TABLET    Take 50 mg by mouth 2 (two) times daily.    SUCRALFATE (CARAFATE) 1 GRAM TABLET    Take 1 g by mouth 4 (four) times daily.        Past Social History:   Social History     Socioeconomic History    Marital status:    Tobacco Use    Smoking status: Never Smoker    Smokeless tobacco: Never Used   Substance and Sexual Activity    Alcohol use: Never    Drug use: Never       Allergies:   Review of patient's allergies indicates:   Allergen Reactions    Dihydrocodeine-cpm-pseudoephed Itching    Levaquin [levofloxacin] Hallucinations    Sulfamethoxazole-trimethoprim Hives        Family History: History reviewed. No pertinent family history.     Review of Systems:  Review of Systems    Constitutional: Negative for activity change and appetite change.   HENT: Negative for congestion and dental problem.    Respiratory: Negative for chest tightness and shortness of breath.    Cardiovascular: Negative for chest pain.   Gastrointestinal: Negative for abdominal distention and abdominal pain.   Genitourinary: Negative for decreased urine volume, difficulty urinating, dyspareunia, dysuria, enuresis, flank pain, frequency, genital sores, hematuria, pelvic pain and urgency.   Musculoskeletal: Negative for back pain and neck pain.   Allergic/Immunologic: Negative for immunocompromised state.   Neurological: Negative for dizziness.   Hematological: Negative for adenopathy.   Psychiatric/Behavioral: Negative for agitation, behavioral problems and confusion.       Physical Exam:  Physical Exam  Constitutional:       Appearance: She is well-developed.   HENT:      Head: Normocephalic and atraumatic.      Right Ear: External ear normal.      Left Ear: External ear normal.   Eyes:      Conjunctiva/sclera: Conjunctivae normal.   Neck:      Vascular: No JVD.   Cardiovascular:      Rate and Rhythm: Normal rate and regular rhythm.   Pulmonary:      Effort: Pulmonary effort is normal. No respiratory distress.      Breath sounds: Normal breath sounds. No wheezing.   Abdominal:      General: There is no distension.      Palpations: Abdomen is soft.      Tenderness: There is no abdominal tenderness. There is no rebound.   Musculoskeletal:         General: Normal range of motion.      Cervical back: Normal range of motion.   Skin:     General: Skin is warm and dry.      Findings: No erythema or rash.   Neurological:      Mental Status: She is alert and oriented to person, place, and time.   Psychiatric:         Behavior: Behavior normal.         Assessment/Plan:       Problem List Items Addressed This Visit    None     Visit Diagnoses     Recurrent UTI    -  Primary             Recurrent UTI:  Currently the patient is  asymptomatic she was instructed to return to clinic with any signs or symptoms of urinary tract infection which point we will evaluate her and began to build a history on her infection amount

## 2022-04-02 ENCOUNTER — HISTORICAL (OUTPATIENT)
Dept: ADMINISTRATIVE | Facility: HOSPITAL | Age: 72
End: 2022-04-02

## 2022-05-04 DIAGNOSIS — E05.90 HYPERTHYROIDISM: Primary | ICD-10-CM

## 2022-05-11 ENCOUNTER — TELEPHONE (OUTPATIENT)
Dept: FAMILY MEDICINE | Facility: CLINIC | Age: 72
End: 2022-05-11
Payer: MEDICARE

## 2022-05-11 NOTE — TELEPHONE ENCOUNTER
----- Message from Rivas Villagomez sent at 5/11/2022 10:08 AM CDT -----  Type: Same Day Appointment Request    Caller is requesting a same day appointment. There is no availability today.    Name of Caller:Farzaneh    Symptoms:coughing , sore throat , and swollen throat    Best Call Back Number:[phone]    Additional Information:

## 2022-06-23 ENCOUNTER — TELEPHONE (OUTPATIENT)
Dept: ADMINISTRATIVE | Facility: HOSPITAL | Age: 72
End: 2022-06-23
Payer: MEDICARE

## 2022-06-23 NOTE — TELEPHONE ENCOUNTER
Type:  Needs Medical Advice    Who Called: self  Symptoms (please be specific): na   How long has patient had these symptoms:  na  Pharmacy name and phone #:  na  Would the patient rather a call back or a response via [facility]? Call back  Best Call Back Number: [phone]  Additional Information: pt calling requesting to have orders placed to check thyroid please advise

## 2022-06-27 ENCOUNTER — LAB VISIT (OUTPATIENT)
Dept: LAB | Facility: HOSPITAL | Age: 72
End: 2022-06-27
Attending: FAMILY MEDICINE
Payer: MEDICARE

## 2022-06-27 DIAGNOSIS — E05.90 HYPERTHYROIDISM: ICD-10-CM

## 2022-06-27 LAB — TSH SERPL-ACNC: 2.49 UIU/ML (ref 0.35–4.94)

## 2022-06-27 PROCEDURE — 84443 ASSAY THYROID STIM HORMONE: CPT

## 2022-06-27 PROCEDURE — 36415 COLL VENOUS BLD VENIPUNCTURE: CPT

## 2022-07-08 DIAGNOSIS — I10 HYPERTENSION, UNSPECIFIED TYPE: Primary | ICD-10-CM

## 2022-07-08 RX ORDER — AMLODIPINE BESYLATE 10 MG/1
10 TABLET ORAL DAILY
Qty: 30 TABLET | Refills: 3 | Status: SHIPPED | OUTPATIENT
Start: 2022-07-08 | End: 2022-12-08

## 2022-07-11 ENCOUNTER — APPOINTMENT (OUTPATIENT)
Dept: LAB | Facility: HOSPITAL | Age: 72
End: 2022-07-11
Attending: FAMILY MEDICINE
Payer: MEDICARE

## 2022-07-11 ENCOUNTER — OFFICE VISIT (OUTPATIENT)
Dept: FAMILY MEDICINE | Facility: CLINIC | Age: 72
End: 2022-07-11
Payer: MEDICARE

## 2022-07-11 VITALS
WEIGHT: 158.88 LBS | TEMPERATURE: 97 F | BODY MASS INDEX: 33.35 KG/M2 | HEART RATE: 68 BPM | SYSTOLIC BLOOD PRESSURE: 130 MMHG | HEIGHT: 58 IN | DIASTOLIC BLOOD PRESSURE: 70 MMHG | OXYGEN SATURATION: 98 % | RESPIRATION RATE: 18 BRPM

## 2022-07-11 DIAGNOSIS — R30.0 DYSURIA: Primary | ICD-10-CM

## 2022-07-11 DIAGNOSIS — E03.9 HYPOTHYROIDISM, UNSPECIFIED TYPE: Primary | ICD-10-CM

## 2022-07-11 DIAGNOSIS — N39.0 ACUTE UTI: ICD-10-CM

## 2022-07-11 PROBLEM — I25.10 CAD (CORONARY ARTERY DISEASE): Status: ACTIVE | Noted: 2017-10-18

## 2022-07-11 PROBLEM — M85.80 OSTEOPENIA: Status: ACTIVE | Noted: 2022-07-11

## 2022-07-11 PROBLEM — G62.9 NEUROPATHY: Status: ACTIVE | Noted: 2022-07-11

## 2022-07-11 PROBLEM — E78.5 HYPERLIPIDEMIA: Status: ACTIVE | Noted: 2022-07-11

## 2022-07-11 PROBLEM — G47.00 INSOMNIA: Status: ACTIVE | Noted: 2022-07-11

## 2022-07-11 PROBLEM — K21.9 GASTROESOPHAGEAL REFLUX DISEASE: Status: ACTIVE | Noted: 2022-07-11

## 2022-07-11 PROBLEM — I10 HYPERTENSION: Status: ACTIVE | Noted: 2022-07-11

## 2022-07-11 LAB
BILIRUB SERPL-MCNC: NEGATIVE MG/DL
BLOOD URINE, POC: NEGATIVE
CLARITY, POC UA: CLEAR
COLOR, POC UA: NORMAL
GLUCOSE UR QL STRIP: NEGATIVE
KETONES UR QL STRIP: NEGATIVE
LEUKOCYTE ESTERASE URINE, POC: NEGATIVE
NITRITE, POC UA: POSITIVE
PH, POC UA: 5
PROTEIN, POC: NORMAL
SPECIFIC GRAVITY, POC UA: 1.01
UROBILINOGEN, POC UA: 0.2

## 2022-07-11 PROCEDURE — 99213 PR OFFICE/OUTPT VISIT, EST, LEVL III, 20-29 MIN: ICD-10-PCS | Mod: ,,, | Performed by: FAMILY MEDICINE

## 2022-07-11 PROCEDURE — 99213 OFFICE O/P EST LOW 20 MIN: CPT | Mod: ,,, | Performed by: FAMILY MEDICINE

## 2022-07-11 PROCEDURE — 81002 POCT URINE DIPSTICK WITHOUT MICROSCOPE: ICD-10-PCS | Mod: ,,, | Performed by: FAMILY MEDICINE

## 2022-07-11 PROCEDURE — 81002 URINALYSIS NONAUTO W/O SCOPE: CPT | Mod: ,,, | Performed by: FAMILY MEDICINE

## 2022-07-11 RX ORDER — CEFDINIR 300 MG/1
300 CAPSULE ORAL 2 TIMES DAILY
Qty: 14 CAPSULE | Refills: 0 | Status: SHIPPED | OUTPATIENT
Start: 2022-07-11 | End: 2022-07-18

## 2022-07-11 RX ORDER — DEXBROMPHENIRAMINE MALEATE 2 MG/1
TABLET ORAL
Qty: 30 TABLET | Refills: 1 | Status: SHIPPED | OUTPATIENT
Start: 2022-07-11 | End: 2022-09-22

## 2022-07-11 NOTE — PROGRESS NOTES
"Subjective:       Patient ID: Farzaneh Velez is a 72 y.o. female.    Chief Complaint: lab work results, edema in hands and feet, burning with uri      Routine      Review of Systems   Constitutional: Negative for chills and fever.   Respiratory: Negative.    Cardiovascular: Positive for leg swelling. Negative for chest pain.   Gastrointestinal: Positive for nausea.   Endocrine:        Hypothyroid: tolerating medication   Genitourinary: Positive for dysuria and frequency. Negative for hematuria.   Musculoskeletal: Positive for back pain.           Objective:      /70 (BP Location: Left arm, Patient Position: Sitting, BP Method: Large (Manual))   Pulse 68   Temp 96.9 °F (36.1 °C)   Resp 18   Ht 4' 10" (1.473 m)   Wt 72.1 kg (158 lb 14.4 oz)   SpO2 98%   BMI 33.21 kg/m²    Physical Exam  Constitutional:       General: She is not in acute distress.     Appearance: Normal appearance.   Neck:      Thyroid: No thyromegaly.   Cardiovascular:      Rate and Rhythm: Normal rate and regular rhythm.   Pulmonary:      Effort: Pulmonary effort is normal.      Breath sounds: Normal breath sounds.   Abdominal:      General: Abdomen is flat. Bowel sounds are normal.      Palpations: Abdomen is soft.   Musculoskeletal:         General: Normal range of motion.   Neurological:      General: No focal deficit present.      Mental Status: She is alert and oriented to person, place, and time.   Psychiatric:         Mood and Affect: Mood normal.         Behavior: Behavior normal.         Thought Content: Thought content normal.         Judgment: Judgment normal.            Latest Reference Range & Units 06/27/22 09:58   Thyroid Stimulating Hormone   0.3500 - 4.9400 uIU/mL 2.4949      07/11/22 13:32   Clarity, UA Clear   Color, UA Dark Yellow   pH, UA 5.0   Spec Grav UA 1.010   Blood, UA negative   WBC, UA negative   Nitrite, UA positive   Glucose, UA negative   Bilirubin, POC negative   Protein, POC trace   Ketones, UA negative "   Urobilinogen, UA 0.2     Assessment:       Problem List Items Addressed This Visit        Endocrine    Hypothyroidism - Primary      Other Visit Diagnoses     Acute UTI        Relevant Medications    cefdinir (OMNICEF) 300 MG capsule    Other Relevant Orders    Urine culture           Plan:     Hypothyroid  Controlled  Continue current medication  Return to clinic with any concerns    Acute UTI  See above Rx  Increase fluids  Send urine for culture/sensitivities  Return to clinic with any concerns

## 2022-07-13 LAB — BACTERIA UR CULT: NORMAL

## 2022-07-14 ENCOUNTER — TELEPHONE (OUTPATIENT)
Dept: FAMILY MEDICINE | Facility: CLINIC | Age: 72
End: 2022-07-14
Payer: MEDICARE

## 2022-07-15 ENCOUNTER — LAB VISIT (OUTPATIENT)
Dept: LAB | Facility: HOSPITAL | Age: 72
End: 2022-07-15
Attending: INTERNAL MEDICINE
Payer: MEDICARE

## 2022-07-15 DIAGNOSIS — M79.89 LEG SWELLING: Primary | ICD-10-CM

## 2022-07-15 LAB
ALBUMIN SERPL-MCNC: 3.8 GM/DL (ref 3.4–4.8)
ALBUMIN/GLOB SERPL: 1.3 RATIO (ref 1.1–2)
ALP SERPL-CCNC: 80 UNIT/L (ref 40–150)
ALT SERPL-CCNC: 18 UNIT/L (ref 0–55)
AST SERPL-CCNC: 23 UNIT/L (ref 5–34)
BASOPHILS # BLD AUTO: 0.11 X10(3)/MCL (ref 0–0.2)
BASOPHILS NFR BLD AUTO: 1.8 %
BILIRUBIN DIRECT+TOT PNL SERPL-MCNC: 0.7 MG/DL
BUN SERPL-MCNC: 21 MG/DL (ref 9.8–20.1)
CALCIUM SERPL-MCNC: 8.2 MG/DL (ref 8.4–10.2)
CHLORIDE SERPL-SCNC: 107 MMOL/L (ref 98–107)
CO2 SERPL-SCNC: 27 MMOL/L (ref 23–31)
CREAT SERPL-MCNC: 1.02 MG/DL (ref 0.55–1.02)
EOSINOPHIL # BLD AUTO: 0.08 X10(3)/MCL (ref 0–0.9)
EOSINOPHIL NFR BLD AUTO: 1.3 %
ERYTHROCYTE [DISTWIDTH] IN BLOOD BY AUTOMATED COUNT: 15.3 % (ref 11.5–17)
GLOBULIN SER-MCNC: 3 GM/DL (ref 2.4–3.5)
GLUCOSE SERPL-MCNC: 90 MG/DL (ref 82–115)
HCT VFR BLD AUTO: 35.9 % (ref 37–47)
HGB BLD-MCNC: 10.7 GM/DL (ref 12–16)
IMM GRANULOCYTES # BLD AUTO: 0.02 X10(3)/MCL (ref 0–0.04)
IMM GRANULOCYTES NFR BLD AUTO: 0.3 %
LYMPHOCYTES # BLD AUTO: 1.25 X10(3)/MCL (ref 0.6–4.6)
LYMPHOCYTES NFR BLD AUTO: 20.2 %
MCH RBC QN AUTO: 19 PG (ref 27–31)
MCHC RBC AUTO-ENTMCNC: 29.8 MG/DL (ref 33–36)
MCV RBC AUTO: 63.8 FL (ref 80–94)
MONOCYTES # BLD AUTO: 0.53 X10(3)/MCL (ref 0.1–1.3)
MONOCYTES NFR BLD AUTO: 8.6 %
NEUTROPHILS # BLD AUTO: 4.2 X10(3)/MCL (ref 2.1–9.2)
NEUTROPHILS NFR BLD AUTO: 67.8 %
NRBC BLD AUTO-RTO: 0 %
PLATELET # BLD AUTO: 344 X10(3)/MCL (ref 130–400)
PMV BLD AUTO: 10.6 FL (ref 7.4–10.4)
POTASSIUM SERPL-SCNC: 4.9 MMOL/L (ref 3.5–5.1)
PROT SERPL-MCNC: 6.8 GM/DL (ref 5.8–7.6)
RBC # BLD AUTO: 5.63 X10(6)/MCL (ref 4.2–5.4)
SODIUM SERPL-SCNC: 145 MMOL/L (ref 136–145)
WBC # SPEC AUTO: 6.2 X10(3)/MCL (ref 4.5–11.5)

## 2022-07-15 PROCEDURE — 80053 COMPREHEN METABOLIC PANEL: CPT

## 2022-07-15 PROCEDURE — 85025 COMPLETE CBC W/AUTO DIFF WBC: CPT

## 2022-07-15 PROCEDURE — 36415 COLL VENOUS BLD VENIPUNCTURE: CPT

## 2022-08-21 ENCOUNTER — HISTORICAL (OUTPATIENT)
Dept: ADMINISTRATIVE | Facility: HOSPITAL | Age: 72
End: 2022-08-21

## 2022-09-16 ENCOUNTER — HISTORICAL (OUTPATIENT)
Dept: ADMINISTRATIVE | Facility: HOSPITAL | Age: 72
End: 2022-09-16
Payer: MEDICARE

## 2022-09-17 ENCOUNTER — HISTORICAL (OUTPATIENT)
Dept: ADMINISTRATIVE | Facility: HOSPITAL | Age: 72
End: 2022-09-17
Payer: MEDICARE

## 2022-09-22 ENCOUNTER — OFFICE VISIT (OUTPATIENT)
Dept: FAMILY MEDICINE | Facility: CLINIC | Age: 72
End: 2022-09-22
Payer: MEDICARE

## 2022-09-22 ENCOUNTER — HOSPITAL ENCOUNTER (OUTPATIENT)
Dept: RADIOLOGY | Facility: HOSPITAL | Age: 72
Discharge: HOME | End: 2022-09-22
Attending: FAMILY MEDICINE
Payer: MEDICARE

## 2022-09-22 VITALS
DIASTOLIC BLOOD PRESSURE: 66 MMHG | TEMPERATURE: 97 F | HEART RATE: 71 BPM | HEIGHT: 58 IN | SYSTOLIC BLOOD PRESSURE: 128 MMHG | OXYGEN SATURATION: 98 % | WEIGHT: 157 LBS | BODY MASS INDEX: 32.95 KG/M2 | RESPIRATION RATE: 18 BRPM

## 2022-09-22 DIAGNOSIS — M54.42 ACUTE BILATERAL LOW BACK PAIN WITH BILATERAL SCIATICA: Primary | ICD-10-CM

## 2022-09-22 DIAGNOSIS — M54.41 ACUTE BILATERAL LOW BACK PAIN WITH BILATERAL SCIATICA: ICD-10-CM

## 2022-09-22 DIAGNOSIS — M54.41 ACUTE BILATERAL LOW BACK PAIN WITH BILATERAL SCIATICA: Primary | ICD-10-CM

## 2022-09-22 DIAGNOSIS — M54.42 ACUTE BILATERAL LOW BACK PAIN WITH BILATERAL SCIATICA: ICD-10-CM

## 2022-09-22 PROCEDURE — 99214 PR OFFICE/OUTPT VISIT, EST, LEVL IV, 30-39 MIN: ICD-10-PCS | Mod: ,,, | Performed by: FAMILY MEDICINE

## 2022-09-22 PROCEDURE — 72100 X-RAY EXAM L-S SPINE 2/3 VWS: CPT | Mod: TC

## 2022-09-22 PROCEDURE — 99214 OFFICE O/P EST MOD 30 MIN: CPT | Mod: ,,, | Performed by: FAMILY MEDICINE

## 2022-09-22 RX ORDER — ESOMEPRAZOLE MAGNESIUM 40 MG/1
40 CAPSULE, DELAYED RELEASE ORAL
COMMUNITY
End: 2023-01-11

## 2022-09-22 RX ORDER — NIRMATRELVIR AND RITONAVIR 300-100 MG
KIT ORAL
COMMUNITY
Start: 2022-08-21 | End: 2023-01-11

## 2022-09-22 RX ORDER — ZOLPIDEM TARTRATE 10 MG/1
10 TABLET ORAL NIGHTLY
COMMUNITY
End: 2022-11-10 | Stop reason: SDUPTHER

## 2022-09-22 RX ORDER — IBUPROFEN 800 MG/1
TABLET ORAL
COMMUNITY
Start: 2021-05-12 | End: 2022-11-10 | Stop reason: SDUPTHER

## 2022-09-22 RX ORDER — FUROSEMIDE 20 MG/1
TABLET ORAL
COMMUNITY
Start: 2022-09-07

## 2022-09-22 RX ORDER — HYDRALAZINE HYDROCHLORIDE 25 MG/1
25 TABLET, FILM COATED ORAL 2 TIMES DAILY
COMMUNITY
Start: 2022-09-12

## 2022-09-22 RX ORDER — METHOCARBAMOL 500 MG/1
500 TABLET, FILM COATED ORAL EVERY 6 HOURS PRN
COMMUNITY
Start: 2022-04-02 | End: 2023-01-11

## 2022-09-22 RX ORDER — FOLIC ACID 1 MG/1
1 TABLET ORAL
COMMUNITY
End: 2023-01-11

## 2022-09-22 RX ORDER — LISINOPRIL 5 MG/1
5 TABLET ORAL
COMMUNITY
End: 2023-01-11

## 2022-09-22 NOTE — PROGRESS NOTES
"Subjective:       Patient ID: Farzaneh Velez is a 72 y.o. female.    Chief Complaint: No chief complaint on file.      Sciatic nerve pain      Review of Systems   Constitutional: Negative.    Respiratory: Negative.     Cardiovascular: Negative.    Gastrointestinal:  Negative for fecal incontinence.   Genitourinary:  Negative for bladder incontinence.   Musculoskeletal:         LBP: present for several months, getting worse, seen by chiropractor, pain radiates to bilateral legs, having trouble rising from sitting position, using OTC NSAID's   Psychiatric/Behavioral: Negative.           Objective:      /66 (BP Location: Left arm, Patient Position: Sitting, BP Method: Large (Manual))   Pulse 71   Temp 96.9 °F (36.1 °C)   Resp 18   Ht 4' 10" (1.473 m)   Wt 71.2 kg (157 lb)   SpO2 98%   BMI 32.81 kg/m²    Physical Exam  Exam conducted with a chaperone present.   Constitutional:       Appearance: Normal appearance.   Cardiovascular:      Rate and Rhythm: Normal rate and regular rhythm.      Heart sounds: Normal heart sounds.   Pulmonary:      Effort: Pulmonary effort is normal.      Breath sounds: Normal breath sounds.   Musculoskeletal:      Comments: L-spine  Flexion = 90 degrees  Extension = 25 degrees  Lateral flexion = 25 degrees bilaterally   Neurological:      General: No focal deficit present.      Mental Status: She is alert.   Psychiatric:         Mood and Affect: Mood normal.         Behavior: Behavior normal.         Thought Content: Thought content normal.         Judgment: Judgment normal.           Assessment:       Problem List Items Addressed This Visit    None  Visit Diagnoses       Acute bilateral low back pain with bilateral sciatica    -  Primary    Relevant Orders    Ambulatory referral/consult to Neurosurgery    X-Ray Lumbar Spine Ap And Lateral               Plan:   1. Acute bilateral low back pain with bilateral sciatica  -     Ambulatory referral/consult to Neurosurgery  -     X-Ray " Lumbar Spine Ap And Lateral  Continue OTC NSAIDs  Schedule L-spine x-ray  Refer to Dr. Kendall in [place] per patient request

## 2022-10-03 ENCOUNTER — DOCUMENTATION ONLY (OUTPATIENT)
Dept: ADMINISTRATIVE | Facility: HOSPITAL | Age: 72
End: 2022-10-03
Payer: MEDICARE

## 2022-11-10 ENCOUNTER — OFFICE VISIT (OUTPATIENT)
Dept: FAMILY MEDICINE | Facility: CLINIC | Age: 72
End: 2022-11-10
Payer: MEDICARE

## 2022-11-10 VITALS
TEMPERATURE: 97 F | HEART RATE: 59 BPM | HEIGHT: 58 IN | SYSTOLIC BLOOD PRESSURE: 118 MMHG | OXYGEN SATURATION: 100 % | BODY MASS INDEX: 33.8 KG/M2 | DIASTOLIC BLOOD PRESSURE: 80 MMHG | WEIGHT: 161 LBS | RESPIRATION RATE: 18 BRPM

## 2022-11-10 DIAGNOSIS — G47.00 INSOMNIA, UNSPECIFIED TYPE: ICD-10-CM

## 2022-11-10 DIAGNOSIS — I10 HYPERTENSION, UNSPECIFIED TYPE: Primary | ICD-10-CM

## 2022-11-10 DIAGNOSIS — E03.9 HYPOTHYROIDISM, UNSPECIFIED TYPE: ICD-10-CM

## 2022-11-10 DIAGNOSIS — R30.0 DYSURIA: Primary | ICD-10-CM

## 2022-11-10 LAB
BILIRUB SERPL-MCNC: NORMAL MG/DL
BLOOD URINE, POC: NORMAL
CLARITY, POC UA: CLEAR
COLOR, POC UA: NORMAL
GLUCOSE UR QL STRIP: NORMAL
KETONES UR QL STRIP: NORMAL
LEUKOCYTE ESTERASE URINE, POC: NORMAL
NITRITE, POC UA: NORMAL
PH, POC UA: 5
PROTEIN, POC: NORMAL
SPECIFIC GRAVITY, POC UA: 1.01
UROBILINOGEN, POC UA: NORMAL

## 2022-11-10 PROCEDURE — 81002 POCT URINE DIPSTICK WITHOUT MICROSCOPE: ICD-10-PCS | Mod: ,,, | Performed by: FAMILY MEDICINE

## 2022-11-10 PROCEDURE — 99214 PR OFFICE/OUTPT VISIT, EST, LEVL IV, 30-39 MIN: ICD-10-PCS | Mod: ,,, | Performed by: FAMILY MEDICINE

## 2022-11-10 PROCEDURE — 99214 OFFICE O/P EST MOD 30 MIN: CPT | Mod: ,,, | Performed by: FAMILY MEDICINE

## 2022-11-10 PROCEDURE — 81002 URINALYSIS NONAUTO W/O SCOPE: CPT | Mod: ,,, | Performed by: FAMILY MEDICINE

## 2022-11-10 RX ORDER — ZOLPIDEM TARTRATE 10 MG/1
10 TABLET ORAL NIGHTLY
Qty: 30 TABLET | Refills: 3 | Status: SHIPPED | OUTPATIENT
Start: 2022-11-10 | End: 2023-03-09

## 2022-11-10 RX ORDER — IBUPROFEN 800 MG/1
TABLET ORAL
Qty: 15 TABLET | Refills: 3 | Status: SHIPPED | OUTPATIENT
Start: 2022-11-10 | End: 2023-09-12

## 2022-11-10 NOTE — PROGRESS NOTES
"Subjective:       Patient ID: Farzaneh Velez is a 72 y.o. female.    Chief Complaint: 4 month follow up and Insomnia      Routine    Hypertension  - tolerating medication (no side effects)  - no headaches  - patient without any complaints    Review of Systems   Constitutional: Negative.    Respiratory: Negative.     Cardiovascular: Negative.    Endocrine: Negative for cold intolerance.        Hypothyroid: tolerating medication, medication working well, patient without any complaints     Psychiatric/Behavioral: Negative.          Insomnia:  No help at OTC medication, history of prior medication, desires to restart medication         Objective:      /80 (BP Location: Left arm, Patient Position: Sitting, BP Method: Large (Manual))   Pulse (!) 59   Temp 97.1 °F (36.2 °C)   Resp 18   Ht 4' 10" (1.473 m)   Wt 73 kg (161 lb)   SpO2 100%   BMI 33.65 kg/m²    Physical Exam  Constitutional:       Appearance: Normal appearance.   Neck:      Thyroid: No thyromegaly.   Cardiovascular:      Rate and Rhythm: Normal rate and regular rhythm.      Heart sounds: Normal heart sounds.   Pulmonary:      Effort: Pulmonary effort is normal.      Breath sounds: Normal breath sounds.   Neurological:      Mental Status: She is alert.   Psychiatric:         Mood and Affect: Mood normal.         Behavior: Behavior normal.         Thought Content: Thought content normal.         Judgment: Judgment normal.           Assessment:       Problem List Items Addressed This Visit          Cardiac/Vascular    Hypertension - Primary       Endocrine    Hypothyroidism       Other    Insomnia    Relevant Medications    zolpidem (AMBIEN) 10 mg Tab          Plan:   1. Hypertension, unspecified type  Controlled  Continue current Rx medication  Return to clinic with any concerns    2. Hypothyroidism, unspecified type  Controlled  Continue current Rx medication  Return to clinic with any concerns    3.  Insomnia  Restart Ambien   Sleep hygiene "   Monitor  Return to clinic with any concerns    Other orders  -     ibuprofen (ADVIL,MOTRIN) 800 MG tablet; See Instructions, TAKE 1 TABLET BY MOUTH EVERY 8 HOURS FOR 5 DAYS, # 15 tab(s), 3 Refill(s), Pharmacy: [facility], 150, cm, Height/Length Dosing, 01/27/21 13:12:00 CST, 73, kg, Weight Dosing, 01/27/21 13:12:00 CST Strength: 800 mg  Dispense: 15 tablet; Refill: 3

## 2022-11-30 ENCOUNTER — HISTORICAL (OUTPATIENT)
Dept: ADMINISTRATIVE | Facility: HOSPITAL | Age: 72
End: 2022-11-30

## 2022-12-12 ENCOUNTER — OFFICE VISIT (OUTPATIENT)
Dept: FAMILY MEDICINE | Facility: CLINIC | Age: 72
End: 2022-12-12
Payer: MEDICARE

## 2022-12-12 VITALS
RESPIRATION RATE: 20 BRPM | HEIGHT: 58 IN | DIASTOLIC BLOOD PRESSURE: 63 MMHG | TEMPERATURE: 97 F | SYSTOLIC BLOOD PRESSURE: 133 MMHG | HEART RATE: 65 BPM | WEIGHT: 158 LBS | BODY MASS INDEX: 33.17 KG/M2

## 2022-12-12 DIAGNOSIS — R30.0 DYSURIA: ICD-10-CM

## 2022-12-12 DIAGNOSIS — N30.00 ACUTE CYSTITIS WITHOUT HEMATURIA: ICD-10-CM

## 2022-12-12 LAB
BILIRUB SERPL-MCNC: ABNORMAL MG/DL
BLOOD URINE, POC: ABNORMAL
CLARITY, POC UA: CLEAR
COLOR, POC UA: YELLOW
GLUCOSE UR QL STRIP: ABNORMAL
KETONES UR QL STRIP: ABNORMAL
LEUKOCYTE ESTERASE URINE, POC: ABNORMAL
NITRITE, POC UA: ABNORMAL
PH, POC UA: 6
PROTEIN, POC: ABNORMAL
SPECIFIC GRAVITY, POC UA: 1.01
UROBILINOGEN, POC UA: 0.2

## 2022-12-12 PROCEDURE — 99213 PR OFFICE/OUTPT VISIT, EST, LEVL III, 20-29 MIN: ICD-10-PCS | Mod: ,,, | Performed by: NURSE PRACTITIONER

## 2022-12-12 PROCEDURE — 99213 OFFICE O/P EST LOW 20 MIN: CPT | Mod: ,,, | Performed by: NURSE PRACTITIONER

## 2022-12-12 PROCEDURE — 81002 URINALYSIS NONAUTO W/O SCOPE: CPT | Mod: RHCUB | Performed by: NURSE PRACTITIONER

## 2022-12-12 RX ORDER — PHENAZOPYRIDINE HYDROCHLORIDE 200 MG/1
200 TABLET, FILM COATED ORAL 3 TIMES DAILY PRN
Qty: 9 TABLET | Refills: 0 | Status: SHIPPED | OUTPATIENT
Start: 2022-12-12 | End: 2022-12-15

## 2022-12-12 RX ORDER — NITROFURANTOIN MACROCRYSTALS 50 MG/1
100 CAPSULE ORAL 2 TIMES DAILY
Qty: 20 CAPSULE | Refills: 0 | Status: SHIPPED | OUTPATIENT
Start: 2022-12-12 | End: 2022-12-22

## 2022-12-12 RX ORDER — TRAMADOL HYDROCHLORIDE 50 MG/1
50-100 TABLET ORAL EVERY 6 HOURS PRN
COMMUNITY
Start: 2022-12-02 | End: 2023-01-11

## 2022-12-12 RX ORDER — CYCLOBENZAPRINE HCL 10 MG
TABLET ORAL
COMMUNITY
Start: 2022-12-12 | End: 2023-01-11

## 2022-12-12 NOTE — PROGRESS NOTES
Subjective:       Patient ID: Farzaneh Velez is a 72 y.o. female.    Chief Complaint: Dysuria (Dysuria X's 2 days)      The patient presents for urinary discomfort.  She admits to taking tub baths every day for 2 weeks.  The patient has burning pain on urination.  She describes the pain as severe.  Relief measures at home have been 1 Macrodantin last night and a Pyridium      Review of Systems   Genitourinary:  Positive for dysuria, frequency and urgency.   All other systems reviewed and are negative.      Objective:      Physical Exam  Vitals and nursing note reviewed.   Constitutional:       Appearance: She is obese.   HENT:      Head: Normocephalic.      Right Ear: Tympanic membrane normal.      Left Ear: Tympanic membrane normal.      Nose: Nose normal.      Mouth/Throat:      Mouth: Mucous membranes are moist.   Eyes:      Extraocular Movements: Extraocular movements intact.   Cardiovascular:      Rate and Rhythm: Normal rate and regular rhythm.   Pulmonary:      Effort: Pulmonary effort is normal.      Breath sounds: Normal breath sounds.   Abdominal:      General: Bowel sounds are normal.      Palpations: Abdomen is soft.   Musculoskeletal:         General: Normal range of motion.      Cervical back: Normal range of motion and neck supple.   Skin:     General: Skin is warm and dry.   Neurological:      Mental Status: She is alert and oriented to person, place, and time.       Assessment:       Problem List Items Addressed This Visit          Renal/    Dysuria    Relevant Medications    phenazopyridine (PYRIDIUM) 200 MG tablet    Other Relevant Orders    POCT URINE DIPSTICK WITHOUT MICROSCOPE (Completed)    Acute cystitis without hematuria         Plan:         Acute cystitis without hematuria and with dysuria:  Take antibiotics as prescribed  Take Pyridium for discomfort as prescribed  Drink 4-6 8 oz glasses of water and non caffeinated beverages daily  Wear white cotton underwear  Do not take tub  baths  Call the office with any questions or concerns

## 2022-12-28 ENCOUNTER — OFFICE VISIT (OUTPATIENT)
Dept: UROLOGY | Facility: CLINIC | Age: 72
End: 2022-12-28
Payer: MEDICARE

## 2022-12-28 VITALS — HEIGHT: 58 IN | WEIGHT: 158 LBS | BODY MASS INDEX: 33.17 KG/M2 | RESPIRATION RATE: 18 BRPM

## 2022-12-28 DIAGNOSIS — R82.90 ABNORMAL URINALYSIS: ICD-10-CM

## 2022-12-28 DIAGNOSIS — N39.0 FREQUENT UTI: Primary | ICD-10-CM

## 2022-12-28 DIAGNOSIS — R30.0 DYSURIA: ICD-10-CM

## 2022-12-28 PROCEDURE — 99213 PR OFFICE/OUTPT VISIT, EST, LEVL III, 20-29 MIN: ICD-10-PCS | Mod: S$GLB,,, | Performed by: NURSE PRACTITIONER

## 2022-12-28 PROCEDURE — 99213 OFFICE O/P EST LOW 20 MIN: CPT | Mod: S$GLB,,, | Performed by: NURSE PRACTITIONER

## 2022-12-28 RX ORDER — AMOXICILLIN AND CLAVULANATE POTASSIUM 875; 125 MG/1; MG/1
1 TABLET, FILM COATED ORAL 2 TIMES DAILY
Qty: 14 TABLET | Refills: 0 | Status: SHIPPED | OUTPATIENT
Start: 2022-12-28 | End: 2023-01-04

## 2022-12-28 NOTE — PROGRESS NOTES
Subjective:       Patient ID: Farzaneh Velez is a 72 y.o. female.    Chief Complaint: UTI (Pts states she had UTI a couple weeks ago.  Completed macrobid and pyridium.  2 days after completion pt states dysuria returned. )      HPI: 72-year-old female, patient of Dr. Hair, presents with complaint of UTI.    Patient is complaining of burning with urination, frequency, urgency, and odor to her urine.    She denies any fever or body aches.  Denies seeing any blood in the urine.  Denies any leakage.      Patient completed Macrobid 2 days ago.  She saw a family practice physician and was treated with Macrobid.    Again patient has completed medication 2 days ago with no significant improvement in symptoms.      Patient states he has a history of UTIs.    States this is the 3rd UTI since her last visit with us in 2022.    Urine culture noted from 2022, shows no growth.  Urine culture from 2021 shows no growth.     Patient reports history of interstitial cystitis.    States he has not been following her diet to the best.  Since symptoms started she has been titrating up her diet.    She is also been under increased stress due to death in the family      No other urinary complaints at this time.       Past Medical History:   Past Medical History:   Diagnosis Date    Anemia     Hypertension     Urinary tract infection        Past Surgical Historical:   Past Surgical History:   Procedure Laterality Date     SECTION      COLONOSCOPY  2017    removal of thyroid          Medications:   Medication List with Changes/Refills   New Medications    AMOXICILLIN-CLAVULANATE 875-125MG (AUGMENTIN) 875-125 MG PER TABLET    Take 1 tablet by mouth 2 (two) times daily. for 7 days   Current Medications    AMLODIPINE (NORVASC) 10 MG TABLET    Take 1 tablet by mouth once daily    ASPIRIN (ECOTRIN) 81 MG EC TABLET    Take 81 mg by mouth once daily.    CYCLOBENZAPRINE (FLEXERIL) 10 MG TABLET        DICLOFENAC SODIUM  (VOLTAREN) 1 % GEL    APPLY 2 GRAMS TOPICALLY TWICE DAILY AS NEEDED FOR JOINT PAIN    ESOMEPRAZOLE (NEXIUM) 40 MG CAPSULE    Take 40 mg by mouth.    FAMOTIDINE (PEPCID) 20 MG TABLET    Take 20 mg by mouth 2 (two) times daily.    FOLIC ACID (FOLVITE) 1 MG TABLET    Take 1 mg by mouth.    FUROSEMIDE (LASIX) 20 MG TABLET        GABAPENTIN (NEURONTIN) 300 MG CAPSULE    Take 300 mg by mouth 3 (three) times daily.    HYDRALAZINE (APRESOLINE) 25 MG TABLET    Take 25 mg by mouth 2 (two) times daily.    IBUPROFEN (ADVIL,MOTRIN) 800 MG TABLET    See Instructions, TAKE 1 TABLET BY MOUTH EVERY 8 HOURS FOR 5 DAYS, # 15 tab(s), 3 Refill(s), Pharmacy: [facility] 386, 150, cm, Height/Length Dosing, 01/27/21 13:12:00 CST, 73, kg, Weight Dosing, 01/27/21 13:12:00 CST Strength: 800 mg    LEVOTHYROXINE (SYNTHROID) 75 MCG TABLET    Take 1 tablet by mouth once daily    LISINOPRIL (PRINIVIL,ZESTRIL) 5 MG TABLET    Take 5 mg by mouth.    LOVASTATIN (MEVACOR) 20 MG TABLET    Take 20 mg by mouth once daily.    MELOXICAM (MOBIC) 15 MG TABLET    Take 1 tablet by mouth once daily    METHOCARBAMOL (ROBAXIN) 500 MG TAB    Take 500 mg by mouth every 6 (six) hours as needed.    PAXLOVID, EUA, 300 MG (150 MG X 2)-100 MG COPACKAGED TABLETS (EUA)    Take by mouth.    SUCRALFATE (CARAFATE) 1 GRAM TABLET    Take 1 g by mouth 4 (four) times daily.    TRAMADOL (ULTRAM) 50 MG TABLET    Take  mg by mouth every 6 (six) hours as needed.    ZOLPIDEM (AMBIEN) 10 MG TAB    Take 1 tablet (10 mg total) by mouth every evening.        Past Social History:   Social History     Socioeconomic History    Marital status:    Tobacco Use    Smoking status: Never    Smokeless tobacco: Never   Substance and Sexual Activity    Alcohol use: Never    Drug use: Never       Allergies:   Review of patient's allergies indicates:   Allergen Reactions    Dihydrocodeine-cpm-pseudoephed Itching    Levaquin [levofloxacin] Hallucinations     Sulfamethoxazole-trimethoprim Hives        Family History:   Family History   Problem Relation Age of Onset    Hypertension Mother     Heart attack Mother     Hypertension Father     Heart attack Father         Review of Systems:  Review of Systems   Constitutional:  Negative for activity change and appetite change.   HENT:  Negative for congestion and dental problem.    Respiratory:  Negative for chest tightness and shortness of breath.    Cardiovascular:  Negative for chest pain.   Gastrointestinal:  Negative for abdominal distention.   Genitourinary:  Positive for dysuria, frequency and urgency. Negative for decreased urine volume, difficulty urinating, dyspareunia, enuresis, flank pain, genital sores, hematuria and pelvic pain.   Musculoskeletal:  Negative for back pain and neck pain.   Allergic/Immunologic: Negative for immunocompromised state.   Neurological:  Negative for dizziness.   Hematological:  Negative for adenopathy.   Psychiatric/Behavioral:  Negative for agitation, behavioral problems and confusion.      Physical Exam:  Physical Exam  Vitals and nursing note reviewed.   Constitutional:       Appearance: She is well-developed.   HENT:      Head: Normocephalic.   Cardiovascular:      Rate and Rhythm: Normal rate and regular rhythm.      Heart sounds: Normal heart sounds.   Pulmonary:      Effort: Pulmonary effort is normal.      Breath sounds: Normal breath sounds.   Abdominal:      General: Bowel sounds are normal.      Palpations: Abdomen is soft.   Skin:     General: Skin is warm and dry.   Neurological:      Mental Status: She is alert and oriented to person, place, and time.     Urinalysis: Trace leukocytes, white blood cell 6 to 10, bacteria +4, positive nitrates.    Assessment/Plan:   Frequent UTI/dysuria/abnormal urinalysis:  Start the patient on Augmentin.    Will culture urine.  Will change antibiotics as indicated.      Follow-up to arrange pending urine culture.  Problem List Items  Addressed This Visit          Renal/    Dysuria    Relevant Medications    amoxicillin-clavulanate 875-125mg (AUGMENTIN) 875-125 mg per tablet    Other Relevant Orders    POCT Urinalysis (w/Micro Option)     Other Visit Diagnoses       Frequent UTI    -  Primary    Relevant Medications    amoxicillin-clavulanate 875-125mg (AUGMENTIN) 875-125 mg per tablet    Other Relevant Orders    POCT Urinalysis (w/Micro Option)    Abnormal urinalysis        Relevant Orders    Urine culture

## 2022-12-30 LAB — URINE CULTURE, ROUTINE: NORMAL

## 2023-01-05 ENCOUNTER — OFFICE VISIT (OUTPATIENT)
Dept: FAMILY MEDICINE | Facility: CLINIC | Age: 73
End: 2023-01-05
Payer: MEDICARE

## 2023-01-05 ENCOUNTER — TELEPHONE (OUTPATIENT)
Dept: UROLOGY | Facility: CLINIC | Age: 73
End: 2023-01-05
Payer: MEDICARE

## 2023-01-05 VITALS
OXYGEN SATURATION: 98 % | RESPIRATION RATE: 18 BRPM | WEIGHT: 159 LBS | BODY MASS INDEX: 33.37 KG/M2 | TEMPERATURE: 98 F | SYSTOLIC BLOOD PRESSURE: 136 MMHG | HEIGHT: 58 IN | DIASTOLIC BLOOD PRESSURE: 68 MMHG | HEART RATE: 52 BPM

## 2023-01-05 DIAGNOSIS — Z01.818 PRE-OP EVALUATION: Primary | ICD-10-CM

## 2023-01-05 DIAGNOSIS — I48.91 ATRIAL FIBRILLATION, UNSPECIFIED TYPE: ICD-10-CM

## 2023-01-05 DIAGNOSIS — R30.0 DYSURIA: ICD-10-CM

## 2023-01-05 LAB
BILIRUB SERPL-MCNC: NORMAL MG/DL
BLOOD URINE, POC: NORMAL
CLARITY, POC UA: CLEAR
COLOR, POC UA: NORMAL
GLUCOSE UR QL STRIP: NORMAL
KETONES UR QL STRIP: NORMAL
LEUKOCYTE ESTERASE URINE, POC: NORMAL
NITRITE, POC UA: NORMAL
PH, POC UA: 5
PROTEIN, POC: NORMAL
SPECIFIC GRAVITY, POC UA: 1.01
UROBILINOGEN, POC UA: 0.2

## 2023-01-05 PROCEDURE — 99214 OFFICE O/P EST MOD 30 MIN: CPT | Mod: ,,, | Performed by: FAMILY MEDICINE

## 2023-01-05 PROCEDURE — 81002 URINALYSIS NONAUTO W/O SCOPE: CPT | Mod: ,,, | Performed by: FAMILY MEDICINE

## 2023-01-05 PROCEDURE — 99214 PR OFFICE/OUTPT VISIT, EST, LEVL IV, 30-39 MIN: ICD-10-PCS | Mod: ,,, | Performed by: FAMILY MEDICINE

## 2023-01-05 PROCEDURE — 81002 POCT URINE DIPSTICK WITHOUT MICROSCOPE: ICD-10-PCS | Mod: ,,, | Performed by: FAMILY MEDICINE

## 2023-01-05 RX ORDER — PHENAZOPYRIDINE HYDROCHLORIDE 200 MG/1
200 TABLET, FILM COATED ORAL 3 TIMES DAILY PRN
Qty: 9 TABLET | Refills: 0 | Status: SHIPPED | OUTPATIENT
Start: 2023-01-05 | End: 2023-01-11

## 2023-01-05 NOTE — TELEPHONE ENCOUNTER
----- Message from Kecia Woody sent at 1/5/2023 10:30 AM CST -----  Contact: pt  Pt calling to test results and she can be reached at [phone].    Thanks,

## 2023-01-05 NOTE — PROGRESS NOTES
"Subjective:       Patient ID: Farzaneh Velez is a 72 y.o. female.    Chief Complaint: Sx clearance; Surgery clearance- Back, Burning with urination, recent UTI; and recent fall- left knee pain      Surgical clearance      Review of Systems   Constitutional: Negative.    Respiratory: Negative.     Cardiovascular: Negative.    Musculoskeletal:         Patient scheduled for left-sided L4-5 transforaminal lumbar interbody fusion with radical foraminotomy with central decompression of L4-5 in reduction of L4-5 spondylolisthesis with Dr. Kendall         Objective:      /68 (BP Location: Left arm, Patient Position: Sitting, BP Method: Large (Manual))   Pulse (!) 52   Temp 97.5 °F (36.4 °C)   Resp 18   Ht 4' 10" (1.473 m)   Wt 72.1 kg (159 lb)   SpO2 98%   BMI 33.23 kg/m²    Physical Exam  Constitutional:       Appearance: Normal appearance.   Cardiovascular:      Rate and Rhythm: Normal rate. Rhythm irregular.      Heart sounds: Normal heart sounds.   Pulmonary:      Effort: Pulmonary effort is normal.      Breath sounds: Normal breath sounds.   Skin:     General: Skin is warm.   Neurological:      Mental Status: She is alert.   Psychiatric:         Mood and Affect: Mood normal.         Behavior: Behavior normal.         Thought Content: Thought content normal.         Judgment: Judgment normal.         Recent Results (from the past 504 hour(s))   Urine culture    Collection Time: 12/28/22  1:45 PM    Specimen: Urine, Clean Catch   Result Value Ref Range    Urine Culture, Routine     POCT URINE DIPSTICK WITHOUT MICROSCOPE    Collection Time: 01/05/23  1:00 PM   Result Value Ref Range    Color, UA Light Yellow     pH, UA 5.0     WBC, UA neg     Nitrite, UA neg     Protein, POC neg     Glucose, UA neg     Ketones, UA neg     Urobilinogen, UA 0.2     Bilirubin, POC neg     Blood, UA neg     Clarity, UA Clear     Spec Grav UA 1.015         Assessment:       Problem List Items Addressed This Visit          " Cardiac/Vascular    Atrial fibrillation, unspecified type       Renal/    Dysuria    Relevant Medications    phenazopyridine (PYRIDIUM) 200 MG tablet    Other Relevant Orders    POCT URINE DIPSTICK WITHOUT MICROSCOPE (Completed)     Other Visit Diagnoses       Pre-op evaluation    -  Primary                 Plan:   1. Pre-op evaluation  Surgery discussed with patient; patient agrees to go forward  Keep scheduled appointment with Dr. Kendall  Patient cleared medically  Cardiac clearance with Cardiology    2. Dysuria  -     POCT URINE DIPSTICK WITHOUT MICROSCOPE  -     phenazopyridine (PYRIDIUM) 200 MG tablet; Take 1 tablet (200 mg total) by mouth 3 (three) times daily as needed for Pain.  Dispense: 9 tablet; Refill: 0  Rx for Pyridium 200 mg t.i.d. x3 days   UA today negative    3. Atrial fibrillation, unspecified type  Patient followed by Cardiology

## 2023-01-06 ENCOUNTER — TELEPHONE (OUTPATIENT)
Dept: UROLOGY | Facility: CLINIC | Age: 73
End: 2023-01-06
Payer: MEDICARE

## 2023-01-06 NOTE — TELEPHONE ENCOUNTER
Pt notified apt with BL on the 13th at 9:30AM. I advised she continue with showers not bath, avoid soda and caffeine, and continue with IC diet.

## 2023-01-06 NOTE — TELEPHONE ENCOUNTER
Pt reports pcp checked urine, no uti. C/O constant urethra burning. Takes showers not baths, drinking only water. Pcp gave azo which helped some. I asked when was last exam in that area she reports very long time. I advised I would try to get her on schedule with NICOLE MCKEON next week because she is set for back surgery 1/18/23. Pt will need call back.

## 2023-01-06 NOTE — TELEPHONE ENCOUNTER
----- Message from Humera Finch sent at 1/6/2023 12:30 PM CST -----  Regarding: pt advice  Contact: pt  Pt states that she saw her PCP and the the infection is gone but states that her urethra is still burning. Please call back at [phone]//thank you acc

## 2023-01-11 ENCOUNTER — OFFICE VISIT (OUTPATIENT)
Dept: UROLOGY | Facility: CLINIC | Age: 73
End: 2023-01-11
Payer: MEDICARE

## 2023-01-11 DIAGNOSIS — R30.0 DYSURIA: Primary | ICD-10-CM

## 2023-01-11 DIAGNOSIS — N30.10 INTERSTITIAL CYSTITIS: ICD-10-CM

## 2023-01-11 PROCEDURE — 99214 OFFICE O/P EST MOD 30 MIN: CPT | Mod: S$GLB,,, | Performed by: FAMILY MEDICINE

## 2023-01-11 PROCEDURE — 99214 PR OFFICE/OUTPT VISIT, EST, LEVL IV, 30-39 MIN: ICD-10-PCS | Mod: S$GLB,,, | Performed by: FAMILY MEDICINE

## 2023-01-11 RX ORDER — FLUTICASONE PROPIONATE 50 MCG
2 SPRAY, SUSPENSION (ML) NASAL
COMMUNITY
Start: 2022-10-18

## 2023-01-11 RX ORDER — AMLODIPINE BESYLATE 10 MG/1
TABLET ORAL
COMMUNITY
Start: 2022-02-28 | End: 2023-03-09

## 2023-01-11 RX ORDER — GABAPENTIN 300 MG/1
CAPSULE ORAL
COMMUNITY
Start: 2021-12-07 | End: 2023-02-08

## 2023-01-11 RX ORDER — HYDROXYZINE HYDROCHLORIDE 25 MG/1
25 TABLET, FILM COATED ORAL 2 TIMES DAILY
Qty: 60 TABLET | Refills: 0 | Status: SHIPPED | OUTPATIENT
Start: 2023-01-11 | End: 2023-02-08

## 2023-01-11 NOTE — PROGRESS NOTES
Subjective:       Patient ID: Farzaneh Velez is a 72 y.o. female.    Chief Complaint: No chief complaint on file.      HPI: 72-year-old female patient established with our clinic.  She is been seen in the past by Dr. Hair and Edgar Dodge, NICOLE for frequent UTIs.  The patient states she is been having burning, frequency, urgency,.  She states she has a history of IC.  The patient says she is been under increased amount of stress due to a death in the family.  In the past she is taken Atarax for IC and her symptoms improved drastically.  She is also going to have back surgery on 2023 and wants to make sure she does not have any urinary tract infection at this time.       Past Medical History:   Past Medical History:   Diagnosis Date    A-fib     Anemia     CAD (coronary artery disease)     Cystitis     Dysuria     GERD (gastroesophageal reflux disease)     Hypertension     Hypothyroidism, unspecified     Insomnia     Mixed hyperlipidemia     Neuropathy     Osteopenia     Thalassemia     Urinary tract infection        Past Surgical Historical:   Past Surgical History:   Procedure Laterality Date    cataract repair Bilateral      SECTION      COLONOSCOPY  2017    removal of thyroid          Medications:   Medication List with Changes/Refills   New Medications    CONJUGATED ESTROGENS (PREMARIN) VAGINAL CREAM    Place 1 g vaginally twice a week.    HYDROXYZINE HCL (ATARAX) 25 MG TABLET    Take 1 tablet (25 mg total) by mouth 2 (two) times daily.   Current Medications    AMLODIPINE (NORVASC) 10 MG TABLET    Take by mouth.    ASPIRIN (ECOTRIN) 81 MG EC TABLET    Take 81 mg by mouth once daily.    FAMOTIDINE (PEPCID) 20 MG TABLET    Take 20 mg by mouth 2 (two) times daily.    FLUTICASONE PROPIONATE (FLONASE) 50 MCG/ACTUATION NASAL SPRAY    2 sprays by Each Nostril route as needed.    FUROSEMIDE (LASIX) 20 MG TABLET        GABAPENTIN (NEURONTIN) 300 MG CAPSULE    Take by mouth.    HYDRALAZINE  (APRESOLINE) 25 MG TABLET    Take 25 mg by mouth 2 (two) times daily.    IBUPROFEN (ADVIL,MOTRIN) 800 MG TABLET    See Instructions, TAKE 1 TABLET BY MOUTH EVERY 8 HOURS FOR 5 DAYS, # 15 tab(s), 3 Refill(s), Pharmacy: [facility] 386, 150, cm, Height/Length Dosing, 01/27/21 13:12:00 CST, 73, kg, Weight Dosing, 01/27/21 13:12:00 CST Strength: 800 mg    LEVOTHYROXINE (SYNTHROID) 75 MCG TABLET    Take 1 tablet by mouth once daily    LOVASTATIN (MEVACOR) 20 MG TABLET    Take 20 mg by mouth once daily.    MELOXICAM (MOBIC) 15 MG TABLET    Take 1 tablet by mouth once daily    SUCRALFATE (CARAFATE) 1 GRAM TABLET    Take 1 g by mouth 4 (four) times daily.    ZOLPIDEM (AMBIEN) 10 MG TAB    Take 1 tablet (10 mg total) by mouth every evening.   Discontinued Medications    AMLODIPINE (NORVASC) 10 MG TABLET    Take 1 tablet by mouth once daily    CYCLOBENZAPRINE (FLEXERIL) 10 MG TABLET        DICLOFENAC SODIUM (VOLTAREN) 1 % GEL    APPLY 2 GRAMS TOPICALLY TWICE DAILY AS NEEDED FOR JOINT PAIN    ESOMEPRAZOLE (NEXIUM) 40 MG CAPSULE    Take 40 mg by mouth.    FOLIC ACID (FOLVITE) 1 MG TABLET    Take 1 mg by mouth.    GABAPENTIN (NEURONTIN) 300 MG CAPSULE    Take 300 mg by mouth 3 (three) times daily.    LISINOPRIL (PRINIVIL,ZESTRIL) 5 MG TABLET    Take 5 mg by mouth.    METHOCARBAMOL (ROBAXIN) 500 MG TAB    Take 500 mg by mouth every 6 (six) hours as needed.    PAXLOVID, EUA, 300 MG (150 MG X 2)-100 MG COPACKAGED TABLETS (EUA)    Take by mouth.    PHENAZOPYRIDINE (PYRIDIUM) 200 MG TABLET    Take 1 tablet (200 mg total) by mouth 3 (three) times daily as needed for Pain.    TRAMADOL (ULTRAM) 50 MG TABLET    Take  mg by mouth every 6 (six) hours as needed.        Past Social History:   Social History     Socioeconomic History    Marital status:    Tobacco Use    Smoking status: Never    Smokeless tobacco: Never   Substance and Sexual Activity    Alcohol use: Never    Drug use: Never    Sexual activity: Not Currently      Partners: Male       Allergies:   Review of patient's allergies indicates:   Allergen Reactions    Tizanidine Hallucinations     Patient saw shadows & figures on the wall    Levaquin [levofloxacin] Hallucinations    Sulfamethoxazole-trimethoprim Hives        Family History:   Family History   Problem Relation Age of Onset    Hypertension Mother     Heart attack Mother     Thalassemia Mother     Hypertension Father     Heart attack Father         Review of Systems:  Review of Systems   Constitutional:  Negative for activity change, appetite change, chills, diaphoresis, fatigue, fever and unexpected weight change.   HENT:  Negative for congestion, dental problem, drooling, ear discharge, ear pain, facial swelling, hearing loss, mouth sores, nosebleeds, postnasal drip, rhinorrhea, sinus pressure, sinus pain, sneezing, sore throat, tinnitus, trouble swallowing and voice change.    Eyes:  Negative for photophobia, pain, discharge, redness, itching and visual disturbance.   Respiratory:  Negative for apnea, cough, choking, chest tightness, shortness of breath, wheezing and stridor.    Cardiovascular:  Negative for chest pain and leg swelling.   Gastrointestinal:  Negative for abdominal distention, abdominal pain, anal bleeding, blood in stool, constipation, diarrhea, nausea, rectal pain and vomiting.   Endocrine: Negative for cold intolerance, heat intolerance, polydipsia, polyphagia and polyuria.   Genitourinary:  Positive for dysuria, frequency and urgency. Negative for decreased urine volume, difficulty urinating, dyspareunia, enuresis, flank pain, genital sores, hematuria, menstrual problem, pelvic pain, vaginal bleeding, vaginal discharge and vaginal pain.   Musculoskeletal:  Negative for arthralgias, back pain, gait problem, joint swelling, myalgias, neck pain and neck stiffness.   Skin:  Negative for color change, pallor, rash and wound.   Allergic/Immunologic: Negative for environmental allergies, food  allergies and immunocompromised state.   Neurological:  Negative for dizziness, tremors, seizures, syncope, facial asymmetry, speech difficulty, weakness, light-headedness, numbness and headaches.   Hematological:  Negative for adenopathy. Does not bruise/bleed easily.   Psychiatric/Behavioral:  Negative for agitation, behavioral problems, confusion, decreased concentration, dysphoric mood, hallucinations, self-injury, sleep disturbance and suicidal ideas. The patient is not nervous/anxious and is not hyperactive.      Physical Exam:  Physical Exam  Exam conducted with a chaperone present.   Constitutional:       Appearance: Normal appearance.   HENT:      Head: Normocephalic.      Nose: Nose normal.      Mouth/Throat:      Mouth: Mucous membranes are moist.      Pharynx: Oropharynx is clear.   Eyes:      Extraocular Movements: Extraocular movements intact.      Conjunctiva/sclera: Conjunctivae normal.      Pupils: Pupils are equal, round, and reactive to light.   Cardiovascular:      Rate and Rhythm: Normal rate and regular rhythm.      Pulses: Normal pulses.      Heart sounds: Normal heart sounds.   Pulmonary:      Effort: Pulmonary effort is normal.      Breath sounds: Normal breath sounds.   Abdominal:      General: Abdomen is flat. Bowel sounds are normal.      Palpations: Abdomen is soft.      Hernia: There is no hernia in the left inguinal area or right inguinal area.   Genitourinary:     General: Normal vulva.      Exam position: Lithotomy position.      Pubic Area: No rash or pubic lice.       Labia:         Right: No rash, tenderness, lesion or injury.         Left: No rash, tenderness, lesion or injury.       Urethra: No prolapse, urethral pain, urethral swelling or urethral lesion.      Rectum: Normal.      Comments: No anatomical abnormalities noted.  No irritation noted at the vaginal opening or urethra.  Musculoskeletal:         General: Normal range of motion.      Cervical back: Normal range of  motion and neck supple.   Lymphadenopathy:      Lower Body: No right inguinal adenopathy. No left inguinal adenopathy.   Skin:     General: Skin is warm and dry.      Capillary Refill: Capillary refill takes less than 2 seconds.   Neurological:      General: No focal deficit present.      Mental Status: She is alert and oriented to person, place, and time. Mental status is at baseline.   Psychiatric:         Mood and Affect: Mood normal.         Behavior: Behavior normal.         Thought Content: Thought content normal.         Judgment: Judgment normal.       Assessment/Plan:       72-year-old female patient established with our clinic.  She is been seen in the past by Dr. Hair and Edgar Dodge, NICOLE for frequent UTIs.  The patient states she is been having burning, frequency, urgency,.  She states she has a history of IC.  The patient says she is been under increased amount of stress due to a death in the family.  In the past she is taken Atarax for IC and her symptoms improved drastically.  She is also going to have back surgery on January 18, 2023 and wants to make sure she does not have any urinary tract infection at this time.  Urinalysis today is negative for infection.  Vaginal exam completed with no abnormalities noted.  We will begin the patient on hydroxyzine to see if she can get some symptom improvement similar to in the past when she was on this medication.  Instructed the patient not to take hydroxyzine within 6 hours of her Ambien.  The patient verbalized understanding.  We also will prescribe vaginal estrogen 2 times a week for the patient.  The patient should return for urine drop-off if she has any increased symptoms of dysuria, fever, back pain.  We will revisit at her follow up appointment to determine if she is improving.    Problem List Items Addressed This Visit          Renal/    Dysuria - Primary    Relevant Medications    conjugated estrogens (PREMARIN) vaginal cream (Start on  1/12/2023)    Other Relevant Orders    POCT Urinalysis (w/Micro Option)     Other Visit Diagnoses       Interstitial cystitis        Relevant Medications    hydrOXYzine HCL (ATARAX) 25 MG tablet

## 2023-02-07 DIAGNOSIS — N30.10 INTERSTITIAL CYSTITIS: ICD-10-CM

## 2023-02-08 RX ORDER — HYDROXYZINE HYDROCHLORIDE 25 MG/1
TABLET, FILM COATED ORAL
Qty: 60 TABLET | Refills: 0 | Status: SHIPPED | OUTPATIENT
Start: 2023-02-08 | End: 2023-03-09 | Stop reason: SDUPTHER

## 2023-02-08 NOTE — TELEPHONE ENCOUNTER
----- Message from Niru Momin sent at 2/8/2023  9:08 AM CST -----  Contact: self  Type:  RX Refill Request    Who Called: Farzaneh Velez  Refill or New Rx:refill  RX Name and Strength:hydrOXYzine HCL (ATARAX) 25 MG tablet  How is the patient currently taking it? (ex. 1XDay):2x day  Is this a 30 day or 90 day RX:30 day  Preferred Pharmacy with phone number:  [facility] - [place] - [address]   [address] [place] [address]  Phone: [phone] Fax: [phone]  Local or Mail Order:local  Ordering Provider:Fabienne Cerda  Would the patient rather a call back or a response via [facility]? Call back  Best Call Back Number:[phone]  Additional Information: n/a

## 2023-03-08 DIAGNOSIS — N30.10 INTERSTITIAL CYSTITIS: ICD-10-CM

## 2023-03-09 DIAGNOSIS — N30.10 INTERSTITIAL CYSTITIS: ICD-10-CM

## 2023-03-09 RX ORDER — HYDROXYZINE HYDROCHLORIDE 25 MG/1
TABLET, FILM COATED ORAL
Qty: 60 TABLET | Refills: 0 | Status: SHIPPED | OUTPATIENT
Start: 2023-03-09 | End: 2023-04-11 | Stop reason: SDUPTHER

## 2023-03-09 RX ORDER — HYDROXYZINE HYDROCHLORIDE 25 MG/1
TABLET, FILM COATED ORAL
Qty: 60 TABLET | Refills: 0 | OUTPATIENT
Start: 2023-03-09

## 2023-03-09 NOTE — TELEPHONE ENCOUNTER
Pt calling for refill on hydroxyzine. Pt states its is working but would like a higher dose sent out. I informed pt that her provider is out on maternity leave and that we can resend medication until provider is back or she can stop the medication until seeing fabienne. Pt would like to continue medication and she will call us mid April to schedule with Fabienne. Medication sent out.     ----- Message from Kecia Woody sent at 3/9/2023  8:21 AM CST -----  Contact: pt  Pt calling for refill on hydrALAZINE (APRESOLINE) 25 MG Pt would like to call up to 50 mg and she can be reached at [phone].  Pt would like a call back from Fabienne.    [facility] [address] [place] [address]  Phone: [phone] Fax: [phone]    Thanks,

## 2023-04-05 ENCOUNTER — OFFICE VISIT (OUTPATIENT)
Dept: OBSTETRICS AND GYNECOLOGY | Facility: CLINIC | Age: 73
End: 2023-04-05
Payer: MEDICARE

## 2023-04-05 VITALS
HEIGHT: 58 IN | WEIGHT: 157 LBS | SYSTOLIC BLOOD PRESSURE: 132 MMHG | DIASTOLIC BLOOD PRESSURE: 70 MMHG | BODY MASS INDEX: 32.95 KG/M2

## 2023-04-05 DIAGNOSIS — Z12.4 CERVICAL CANCER SCREENING: ICD-10-CM

## 2023-04-05 DIAGNOSIS — N36.8 URETHRAL PROLAPSE: ICD-10-CM

## 2023-04-05 DIAGNOSIS — R30.0 DYSURIA: ICD-10-CM

## 2023-04-05 DIAGNOSIS — R39.89 URETHRAL PAIN: Primary | ICD-10-CM

## 2023-04-05 DIAGNOSIS — R10.30 LOWER ABDOMINAL PAIN: ICD-10-CM

## 2023-04-05 LAB
BILIRUB UR QL STRIP: NEGATIVE
GARDNERELLA VAGINALIS: NEGATIVE
GLUCOSE UR QL STRIP: NEGATIVE
KETONES UR QL STRIP: NEGATIVE
LEUKOCYTE ESTERASE UR QL STRIP: NEGATIVE
OTHER MICROSC. OBSERVATIONS: NEGATIVE
PH, POC UA: 7
POC BACTERIAL VAGINOSIS: NEGATIVE
POC BLOOD, URINE: NEGATIVE
POC CLUE CELLS: NEGATIVE
POC NITRATES, URINE: NEGATIVE
PROT UR QL STRIP: NEGATIVE
SP GR UR STRIP: 1.01 (ref 1–1.03)
TRICHOMONAS, POC: NEGATIVE
UROBILINOGEN UR STRIP-ACNC: 0.2 (ref 0.1–1.1)
YEAST WET PREP: NEGATIVE

## 2023-04-05 PROCEDURE — 99204 OFFICE O/P NEW MOD 45 MIN: CPT | Mod: ,,, | Performed by: OBSTETRICS & GYNECOLOGY

## 2023-04-05 PROCEDURE — 81003 URINALYSIS AUTO W/O SCOPE: CPT | Mod: QW,RHCUB | Performed by: OBSTETRICS & GYNECOLOGY

## 2023-04-05 PROCEDURE — 99204 PR OFFICE/OUTPT VISIT, NEW, LEVL IV, 45-59 MIN: ICD-10-PCS | Mod: ,,, | Performed by: OBSTETRICS & GYNECOLOGY

## 2023-04-05 RX ORDER — APIXABAN 5 MG/1
5 TABLET, FILM COATED ORAL 2 TIMES DAILY
COMMUNITY
Start: 2023-03-14

## 2023-04-05 RX ORDER — HYDROCODONE BITARTRATE AND ACETAMINOPHEN 5; 325 MG/1; MG/1
1 TABLET ORAL
COMMUNITY
Start: 2023-03-31 | End: 2024-03-14

## 2023-04-05 NOTE — PROGRESS NOTES
"  Chief Complaint: reestablish     Chief Complaint   Patient presents with    urethra burning       HPI:   Farzaneh Velez is a 72 y.o. year old  here for GYN exam.  She is currently seeing Urology for constant burning sensation of the urethra.  No records available for review today.     She was treated for UTI in November.  In December she began experiencing a constant burning of the urethra. Patient reports "My urethra burns all the time since December. I have a history of IC. I have been seeing a urologist & she suggested I see a GYN."  States has not had issues with IC for "years" until now. Reports, "it will hurt like I am going to have a period in my lower stomach, pelvic area.". Denies vaginal bleeding, discharge, odor. Denies any recent imaging of pelvic, ovaries.     She was started on vistaril 25mg BID; which helps alleviated some of the burning but does not fully resolve it.     She was also given an rx for vaginal premarin cream.  Says she has not noticed much difference with the cream.     Reports since December has only taken showers, changed laundry detergent to unscented, stop using soap vaginally, changed to unscented toilet paper, discontinued acidic foods/liquids/caffeine, using only cotton underwear.      GYN history:  MENOPAUSAL  Age at menarche: 15  Age at menopause: 58  Hysterectomy:  No  Last pap: 2017 WNL, Cervical polyp benign  H/o Abnormal Pap: No   Postcoital Bleeding: No  Sexually Active: Not currently   Dyspareunia: No  H/o STI: No   HRT: vaginal premarin  MM WNL  H/o abnl MMG: No   Colonoscopy: 2017         Past Medical History:   Diagnosis Date    A-fib     Anemia     CAD (coronary artery disease)     Constipation     Cystitis     Dysuria     GERD (gastroesophageal reflux disease)     Hypertension     Hypothyroidism, unspecified     IBS (irritable bowel syndrome)     Insomnia     Interstitial cystitis     Mixed hyperlipidemia     Neuropathy     Osteopenia     " Thalassemia     Urinary tract infection      Past Surgical History:   Procedure Laterality Date    cataract repair Bilateral      SECTION      COLONOSCOPY  2017    CYSTECTOMY  2023    Cyst removed from throat    removal of thyroid         Current Outpatient Medications:     amLODIPine (NORVASC) 10 MG tablet, Take 1 tablet by mouth once daily, Disp: 90 tablet, Rfl: 1    aspirin (ECOTRIN) 81 MG EC tablet, Take 81 mg by mouth once daily., Disp: , Rfl:     conjugated estrogens (PREMARIN) vaginal cream, Place 1 g vaginally twice a week., Disp: 1 applicator, Rfl: 0    diclofenac sodium (VOLTAREN) 1 % Gel, APPLY 2 GRAMS TOPICALLY TWICE DAILY AS NEEDED FOR  JOINT  PAIN, Disp: 100 g, Rfl: 0    ELIQUIS 5 mg Tab, Take 5 mg by mouth 2 (two) times daily., Disp: , Rfl:     famotidine (PEPCID) 20 MG tablet, Take 20 mg by mouth 2 (two) times daily., Disp: , Rfl:     furosemide (LASIX) 20 MG tablet, , Disp: , Rfl:     gabapentin (NEURONTIN) 300 MG capsule, TAKE 1 CAPSULE BY MOUTH THREE TIMES DAILY, Disp: 90 capsule, Rfl: 0    hydrALAZINE (APRESOLINE) 25 MG tablet, Take 25 mg by mouth 2 (two) times daily., Disp: , Rfl:     HYDROcodone-acetaminophen (NORCO) 5-325 mg per tablet, Take 1 tablet by mouth., Disp: , Rfl:     hydrOXYzine HCL (ATARAX) 25 MG tablet, TAKE 1 TABLET BY MOUTH ONE IN THE MORNING AND ONE IN THE EVENING. DO  NOT  TAKE  WITHIN  6  HOURS  OF  AMBIEN, Disp: 60 tablet, Rfl: 0    levothyroxine (SYNTHROID) 75 MCG tablet, Take 1 tablet by mouth once daily, Disp: 90 tablet, Rfl: 0    lovastatin (MEVACOR) 20 MG tablet, Take 1 tablet by mouth once daily, Disp: 90 tablet, Rfl: 0    sucralfate (CARAFATE) 1 gram tablet, Take 1 g by mouth 4 (four) times daily., Disp: , Rfl:     zolpidem (AMBIEN) 10 mg Tab, TAKE 1 TABLET BY MOUTH ONCE DAILY IN THE EVENING, Disp: 30 tablet, Rfl: 3    fluticasone propionate (FLONASE) 50 mcg/actuation nasal spray, 2 sprays by Each Nostril route as needed., Disp: , Rfl:      "ibuprofen (ADVIL,MOTRIN) 800 MG tablet, See Instructions, TAKE 1 TABLET BY MOUTH EVERY 8 HOURS FOR 5 DAYS, # 15 tab(s), 3 Refill(s), Pharmacy: [facility], 150, cm, Height/Length Dosing, 21 13:12:00 CST, 73, kg, Weight Dosing, 21 13:12:00 CST Strength: 800 mg (Patient not taking: Reported on 2023), Disp: 15 tablet, Rfl: 3    meloxicam (MOBIC) 15 MG tablet, Take 1 tablet by mouth once daily (Patient not taking: Reported on 2023), Disp: 30 tablet, Rfl: 0  Review of patient's allergies indicates:   Allergen Reactions    Tizanidine Hallucinations     Patient saw shadows & figures on the wall    Cat dander      Other reaction(s): ITCHY EYES NOSE    Latex, natural rubber      "Peeled my skin off"    Levaquin [levofloxacin] Hallucinations    Sulfamethoxazole-trimethoprim Hives     OB History    Para Term  AB Living   3 3 3   0 3   SAB IAB Ectopic Multiple Live Births   0       3      # Outcome Date GA Lbr Tushar/2nd Weight Sex Delivery Anes PTL Lv   3 Term 76   3.43 kg (7 lb 9 oz) M CS-LTranv   NIKITA   2 Term 73   3.232 kg (7 lb 2 oz) F CS-LTranv   NIKITA      Complications: Breech presentation   1 Term 71   2.892 kg (6 lb 6 oz) M Vag-Spont   NIKITA     Social History     Tobacco Use    Smoking status: Never    Smokeless tobacco: Never   Substance Use Topics    Alcohol use: Never    Drug use: Never     Family History   Problem Relation Age of Onset    Hypertension Father     Heart attack Father     Hypertension Mother     Heart attack Mother     Thalassemia Mother     Breast cancer Other        Review of Systems:  Review of Systems   Constitutional:  Negative for appetite change, chills, fatigue, fever and unexpected weight change.   Eyes:  Negative for visual disturbance.   Respiratory:  Negative for cough, shortness of breath and wheezing.    Cardiovascular:  Negative for chest pain, palpitations and leg swelling.   Gastrointestinal:  Negative for abdominal pain, " bloating, blood in stool, constipation, diarrhea, nausea, vomiting, reflux and fecal incontinence.   Endocrine: Negative for hair loss and hot flashes.   Genitourinary:  Negative for bladder incontinence, decreased libido, dysmenorrhea, dyspareunia, dysuria, flank pain, frequency, genital sores, hematuria, hot flashes, menorrhagia, menstrual problem, pelvic pain, urgency, vaginal bleeding, vaginal discharge, vaginal pain, urinary incontinence, postcoital bleeding, postmenopausal bleeding, vaginal dryness and vaginal odor.        Urethra burning, vaginal burning     Integumentary:  Negative for rash, acne, hair changes, breast mass, nipple discharge, breast skin changes and breast tenderness.   Neurological:  Negative for headaches.   Psychiatric/Behavioral:  Negative for depression.    Breast: Negative for asymmetry, breast self exam, lump, mass, mastodynia, nipple discharge, skin changes and tenderness     Physical Exam:   Vitals:    04/05/23 1430   BP: 132/70     Body mass index is 32.81 kg/m².    Physical Exam:   Constitutional: She is oriented to person, place, and time. She appears well-developed and well-nourished.    HENT:   Head: Normocephalic.      Cardiovascular:       Exam reveals no edema.        Pulmonary/Chest: Effort normal. She exhibits no mass, no tenderness, no bony tenderness, no deformity and no retraction. Right breast exhibits no inverted nipple, no mass, no nipple discharge, no skin change, no tenderness, no bleeding, no swelling, no mastectomy, no augmentation and no lumpectomy. Left breast exhibits no inverted nipple, no mass, no nipple discharge, no skin change, no tenderness, no bleeding, no swelling, no mastectomy, no augmentation and no lumpectomy. Breasts are symmetrical.        Abdominal: Soft. She exhibits no distension and no mass. There is no abdominal tenderness. There is no rebound and no guarding. No hernia. Hernia confirmed negative in the right inguinal area.     Genitourinary:     Inguinal canal, vagina, uterus, right adnexa, left adnexa and rectum normal.   Rectum:      No anal fissure or external hemorrhoid.   The external female genitalia was normal.   No external genitalia lesions identified,Genitalia hair distrobution normal .   Labial bartholins normal.There is no rash, tenderness, lesion or injury on the right labia. There is no rash, tenderness, lesion or injury on the left labia. Cervix is normal. No no masses or organomegaly. Right adnexum displays no mass, no tenderness and no fullness. Left adnexum displays no mass, no tenderness and no fullness. Vagina exhibits no lesion. No erythema,  no vaginal discharge, tenderness, bleeding, rectocele, cystocele or unspecified prolapse of vaginal walls in the vagina.    No foreign body in the vagina.      No signs of injury in the vagina.   Vagina was moist.Cervix exhibits no motion tenderness, no lesion, no discharge, no friability, no lesion, no tenderness and no polyp. Uterus consistancy normal. and Uerus contour normal  Uterus is not deviated, not enlarged, not fixed, not tender, not hosting fibroids and no uterine prolapse. Normal urethral meatus.Urethral Meatus exhibits: prolapsed  Urethral Meatus exhibits: urethral lesionUrethra findings: no urethral mass and no tendernessBladder findings: no bladder tenderness   Genitourinary Comments: Ureteral prolapse appears to be healing with previous premarin cream use              Musculoskeletal: Normal range of motion.      Lymphadenopathy: No inguinal adenopathy noted on the right or left side.    Neurological: She is alert and oriented to person, place, and time.    Skin: Skin is warm and dry.    Psychiatric: She has a normal mood and affect. Her behavior is normal. Judgment and thought content normal.      Assessment:   Annual Well Women Exam  1. Urethral pain  - POCT Urinalysis, Dipstick, Automated, W/O Scope  - POCT Wet Prep  - Urine Culture High Risk  - MDL Sendout Test    2. Lower  abdominal pain  - US Pelvis Comp with Transvag NON-OB (xpd; Future  - Urine Culture High Risk  - MDL Sendout Test    3. Urethral prolapse  - Urine Culture High Risk  - MDL Sendout Test    4. Dysuria  - conjugated estrogens (PREMARIN) vaginal cream; Place 0.5 g vaginally twice a week.  Dispense: 1 applicator; Refill: 2  - Urine Culture High Risk  - MDL Sendout Test    5. Cervical cancer screening  - Liquid-Based Pap Smear, Screening Screening    Plan:  Pap smear done - if normal, may discontinue cvx cancer screening  Oneswab GC CZ TV BV CV AV  Wet prep: normal  Pelvic u/s for lower abdominal cramps  Recommend continuation of Vaginal premarin cream to alleviate vaginal atrophy and continue to improve urethral prolapse.   Refill given  Cont hypoallergenic unscented soaps/detergents      Breast self-awareness  Annual mammograms  Rec colonoscopy per guidelines  Rec BMD per guidelines  Rec exercise 3 times weekly  Keep yearly FU with PCP       Will call with results  RTC PRN/Annual

## 2023-04-08 LAB
BACTERIA UR CULT: NORMAL
BACTERIA UR CULT: NORMAL

## 2023-04-11 DIAGNOSIS — N30.10 INTERSTITIAL CYSTITIS: ICD-10-CM

## 2023-04-11 LAB — PSYCHE PATHOLOGY RESULT: NORMAL

## 2023-04-11 RX ORDER — HYDROXYZINE HYDROCHLORIDE 25 MG/1
TABLET, FILM COATED ORAL
Qty: 60 TABLET | Refills: 0 | Status: SHIPPED | OUTPATIENT
Start: 2023-04-11 | End: 2023-05-15

## 2023-04-11 NOTE — TELEPHONE ENCOUNTER
----- Message from Kacey Mercado LPN sent at 4/11/2023 12:24 PM CDT -----  Contact: Pt    ----- Message -----  From: Jt Redman  Sent: 4/11/2023  11:15 AM CDT  To: Prashant Loving Staff    Type:  RX Refill Request    Who Called:  pt   Refill or New Rx: refill   RX Name and Strength: hydrOXYzine HCL (ATARAX) 25 MG tablet  How is the patient currently taking it? (ex. 1XDay): twice daily   Is this a 30 day or 90 day RX: qty 60   Preferred Pharmacy with phone number: [facility]   Local or Mail Order: local   Ordering Provider: prashant   Would the patient rather a call back or a response via [facility]? phone  Best Call Back Number: [phone]  Additional Information:       [facility] - [place] - [address] [place] [address]  Phone: [phone] Fax: [phone]

## 2023-04-14 ENCOUNTER — TELEPHONE (OUTPATIENT)
Dept: OBSTETRICS AND GYNECOLOGY | Facility: CLINIC | Age: 73
End: 2023-04-14
Payer: MEDICARE

## 2023-04-14 DIAGNOSIS — A49.9 BACTERIAL INFECTION: Primary | ICD-10-CM

## 2023-04-14 RX ORDER — AMOXICILLIN AND CLAVULANATE POTASSIUM 875; 125 MG/1; MG/1
1 TABLET, FILM COATED ORAL 2 TIMES DAILY
Qty: 14 TABLET | Refills: 0 | Status: SHIPPED | OUTPATIENT
Start: 2023-04-14 | End: 2023-04-21

## 2023-04-14 NOTE — TELEPHONE ENCOUNTER
----- Message from Griffin Rodriguez MD sent at 4/13/2023 10:09 AM CDT -----  Augmentin 875 PO BID x 7 days

## 2023-04-20 ENCOUNTER — HOSPITAL ENCOUNTER (OUTPATIENT)
Dept: RADIOLOGY | Facility: HOSPITAL | Age: 73
Discharge: HOME | End: 2023-04-20
Attending: OBSTETRICS & GYNECOLOGY
Payer: MEDICARE

## 2023-04-20 DIAGNOSIS — R10.30 LOWER ABDOMINAL PAIN: ICD-10-CM

## 2023-04-20 PROCEDURE — 76856 US EXAM PELVIC COMPLETE: CPT | Mod: TC

## 2023-04-21 ENCOUNTER — TELEPHONE (OUTPATIENT)
Dept: OBSTETRICS AND GYNECOLOGY | Facility: CLINIC | Age: 73
End: 2023-04-21
Payer: MEDICARE

## 2023-04-21 DIAGNOSIS — R10.30 LOWER ABDOMINAL PAIN: Primary | ICD-10-CM

## 2023-04-21 DIAGNOSIS — R39.89 URETHRAL PAIN: ICD-10-CM

## 2023-04-21 NOTE — TELEPHONE ENCOUNTER
----- Message from Griffin Rodriguez MD sent at 4/20/2023  1:18 PM CDT -----  Please let her know this is likely representative of benign fibroids.  Repeat US in 6 weeks to ensure stability.

## 2023-04-24 DIAGNOSIS — E03.9 HYPOTHYROIDISM, UNSPECIFIED TYPE: ICD-10-CM

## 2023-04-24 DIAGNOSIS — Z11.4 ENCOUNTER FOR SCREENING FOR HIV: ICD-10-CM

## 2023-04-24 DIAGNOSIS — Z11.59 NEED FOR HEPATITIS C SCREENING TEST: ICD-10-CM

## 2023-04-24 DIAGNOSIS — Z00.00 WELLNESS EXAMINATION: Primary | ICD-10-CM

## 2023-04-24 DIAGNOSIS — Z13.6 SCREENING FOR ISCHEMIC HEART DISEASE: ICD-10-CM

## 2023-04-24 RX ORDER — FLUCONAZOLE 200 MG/1
200 TABLET ORAL EVERY OTHER DAY
Qty: 7 TABLET | Refills: 0 | Status: SHIPPED | OUTPATIENT
Start: 2023-04-24 | End: 2023-05-08

## 2023-04-24 NOTE — TELEPHONE ENCOUNTER
Advised pt I will speak with him regarding her concerns about the issues she is having with her urethra and call her back by the end of day. Pt verbalized understanding.

## 2023-04-24 NOTE — TELEPHONE ENCOUNTER
" Pt states "[facility] called me to schedule US appt. I know we talked about it but they said I just had one. Can you call them & ask them to call me back so I can schedule it?" Advised her we will call them tomorrow to have them contact her again for scheduling. US should be performed in 6 weeks to ensure stability. Pt verbalized understanding.  "

## 2023-05-01 DIAGNOSIS — M54.41 ACUTE BILATERAL LOW BACK PAIN WITH BILATERAL SCIATICA: ICD-10-CM

## 2023-05-01 DIAGNOSIS — M54.42 ACUTE BILATERAL LOW BACK PAIN WITH BILATERAL SCIATICA: ICD-10-CM

## 2023-05-02 RX ORDER — DICLOFENAC SODIUM 10 MG/G
GEL TOPICAL
Qty: 100 G | Refills: 0 | Status: SHIPPED | OUTPATIENT
Start: 2023-05-02 | End: 2023-07-10

## 2023-05-03 DIAGNOSIS — E03.9 HYPOTHYROIDISM, UNSPECIFIED TYPE: ICD-10-CM

## 2023-05-04 DIAGNOSIS — E03.9 HYPOTHYROIDISM, UNSPECIFIED TYPE: ICD-10-CM

## 2023-05-04 RX ORDER — LOVASTATIN 20 MG/1
20 TABLET ORAL DAILY
Qty: 90 TABLET | Refills: 0 | Status: SHIPPED | OUTPATIENT
Start: 2023-05-04 | End: 2023-09-12

## 2023-05-04 RX ORDER — LEVOTHYROXINE SODIUM 75 UG/1
TABLET ORAL
Qty: 90 TABLET | Refills: 0 | Status: SHIPPED | OUTPATIENT
Start: 2023-05-04 | End: 2023-05-08 | Stop reason: SDUPTHER

## 2023-05-08 ENCOUNTER — LAB VISIT (OUTPATIENT)
Dept: LAB | Facility: HOSPITAL | Age: 73
End: 2023-05-08
Attending: FAMILY MEDICINE
Payer: MEDICARE

## 2023-05-08 ENCOUNTER — TELEPHONE (OUTPATIENT)
Dept: FAMILY MEDICINE | Facility: CLINIC | Age: 73
End: 2023-05-08
Payer: MEDICARE

## 2023-05-08 DIAGNOSIS — Z00.00 WELLNESS EXAMINATION: ICD-10-CM

## 2023-05-08 DIAGNOSIS — Z13.6 SCREENING FOR ISCHEMIC HEART DISEASE: ICD-10-CM

## 2023-05-08 DIAGNOSIS — E03.9 HYPOTHYROIDISM, UNSPECIFIED TYPE: ICD-10-CM

## 2023-05-08 DIAGNOSIS — Z11.59 NEED FOR HEPATITIS C SCREENING TEST: ICD-10-CM

## 2023-05-08 DIAGNOSIS — Z11.4 ENCOUNTER FOR SCREENING FOR HIV: ICD-10-CM

## 2023-05-08 DIAGNOSIS — E03.9 HYPOTHYROIDISM, UNSPECIFIED TYPE: Primary | ICD-10-CM

## 2023-05-08 LAB
ALBUMIN SERPL-MCNC: 3.7 G/DL (ref 3.4–4.8)
ALBUMIN/GLOB SERPL: 1.2 RATIO (ref 1.1–2)
ALP SERPL-CCNC: 96 UNIT/L (ref 40–150)
ALT SERPL-CCNC: 15 UNIT/L (ref 0–55)
AST SERPL-CCNC: 24 UNIT/L (ref 5–34)
BASOPHILS # BLD AUTO: 0.12 X10(3)/MCL
BASOPHILS NFR BLD AUTO: 1.6 %
BILIRUBIN DIRECT+TOT PNL SERPL-MCNC: 0.4 MG/DL
BUN SERPL-MCNC: 16 MG/DL (ref 9.8–20.1)
CALCIUM SERPL-MCNC: 8.7 MG/DL (ref 8.4–10.2)
CHLORIDE SERPL-SCNC: 110 MMOL/L (ref 98–107)
CHOLEST SERPL-MCNC: 186 MG/DL
CHOLEST/HDLC SERPL: 4 {RATIO} (ref 0–5)
CO2 SERPL-SCNC: 29 MMOL/L (ref 23–31)
CREAT SERPL-MCNC: 1.1 MG/DL (ref 0.55–1.02)
EOSINOPHIL # BLD AUTO: 0.15 X10(3)/MCL (ref 0–0.9)
EOSINOPHIL NFR BLD AUTO: 2 %
ERYTHROCYTE [DISTWIDTH] IN BLOOD BY AUTOMATED COUNT: 15.3 % (ref 11.5–17)
GFR SERPLBLD CREATININE-BSD FMLA CKD-EPI: 53 MLS/MIN/1.73/M2
GLOBULIN SER-MCNC: 3 GM/DL (ref 2.4–3.5)
GLUCOSE SERPL-MCNC: 91 MG/DL (ref 82–115)
HCT VFR BLD AUTO: 36.8 % (ref 37–47)
HCV AB SERPL QL IA: NONREACTIVE
HDLC SERPL-MCNC: 42 MG/DL (ref 35–60)
HGB BLD-MCNC: 10.9 G/DL (ref 12–16)
HIV 1+2 AB+HIV1 P24 AG SERPL QL IA: NONREACTIVE
IMM GRANULOCYTES # BLD AUTO: 0.03 X10(3)/MCL (ref 0–0.04)
IMM GRANULOCYTES NFR BLD AUTO: 0.4 %
LDLC SERPL CALC-MCNC: 82 MG/DL (ref 50–140)
LYMPHOCYTES # BLD AUTO: 1.27 X10(3)/MCL (ref 0.6–4.6)
LYMPHOCYTES NFR BLD AUTO: 17.3 %
MCH RBC QN AUTO: 18.4 PG (ref 27–31)
MCHC RBC AUTO-ENTMCNC: 29.6 G/DL (ref 33–36)
MCV RBC AUTO: 62.3 FL (ref 80–94)
MONOCYTES # BLD AUTO: 0.48 X10(3)/MCL (ref 0.1–1.3)
MONOCYTES NFR BLD AUTO: 6.5 %
NEUTROPHILS # BLD AUTO: 5.3 X10(3)/MCL (ref 2.1–9.2)
NEUTROPHILS NFR BLD AUTO: 72.2 %
NRBC BLD AUTO-RTO: 0 %
PLATELET # BLD AUTO: 366 X10(3)/MCL (ref 130–400)
PMV BLD AUTO: 9.8 FL (ref 7.4–10.4)
POTASSIUM SERPL-SCNC: 4.3 MMOL/L (ref 3.5–5.1)
PROT SERPL-MCNC: 6.7 GM/DL (ref 5.8–7.6)
RBC # BLD AUTO: 5.91 X10(6)/MCL (ref 4.2–5.4)
SODIUM SERPL-SCNC: 146 MMOL/L (ref 136–145)
TRIGL SERPL-MCNC: 312 MG/DL (ref 37–140)
TSH SERPL-ACNC: 9.32 UIU/ML (ref 0.35–4.94)
VLDLC SERPL CALC-MCNC: 62 MG/DL
WBC # SPEC AUTO: 7.35 X10(3)/MCL (ref 4.5–11.5)

## 2023-05-08 PROCEDURE — 85025 COMPLETE CBC W/AUTO DIFF WBC: CPT

## 2023-05-08 PROCEDURE — 80053 COMPREHEN METABOLIC PANEL: CPT

## 2023-05-08 PROCEDURE — 84443 ASSAY THYROID STIM HORMONE: CPT

## 2023-05-08 PROCEDURE — 87389 HIV-1 AG W/HIV-1&-2 AB AG IA: CPT

## 2023-05-08 PROCEDURE — 80061 LIPID PANEL: CPT

## 2023-05-08 PROCEDURE — 36415 COLL VENOUS BLD VENIPUNCTURE: CPT

## 2023-05-08 PROCEDURE — 86803 HEPATITIS C AB TEST: CPT

## 2023-05-08 RX ORDER — LEVOTHYROXINE SODIUM 100 UG/1
100 TABLET ORAL DAILY
Qty: 30 TABLET | Refills: 3 | Status: SHIPPED | OUTPATIENT
Start: 2023-05-08 | End: 2023-08-14

## 2023-05-08 NOTE — PROGRESS NOTES
Please inform patient of results.     1. Increase Synthroid 100 mcg q.day.  Rx sent to pharmacy.  Repeat TSH in 6 weeks    Other labwork within acceptable ranges.

## 2023-05-08 NOTE — TELEPHONE ENCOUNTER
----- Message from Ernesto Johnson MD sent at 5/8/2023 12:42 PM CDT -----  Please inform patient of results.     1. Increase Synthroid 100 mcg q.day.  Rx sent to pharmacy.  Repeat TSH in 6 weeks    Other labwork within acceptable ranges.

## 2023-05-10 DIAGNOSIS — N30.10 INTERSTITIAL CYSTITIS: ICD-10-CM

## 2023-05-11 ENCOUNTER — OFFICE VISIT (OUTPATIENT)
Dept: FAMILY MEDICINE | Facility: CLINIC | Age: 73
End: 2023-05-11
Payer: MEDICARE

## 2023-05-11 VITALS
BODY MASS INDEX: 34.22 KG/M2 | DIASTOLIC BLOOD PRESSURE: 70 MMHG | HEART RATE: 60 BPM | RESPIRATION RATE: 18 BRPM | OXYGEN SATURATION: 99 % | WEIGHT: 163 LBS | TEMPERATURE: 98 F | HEIGHT: 58 IN | SYSTOLIC BLOOD PRESSURE: 126 MMHG

## 2023-05-11 DIAGNOSIS — L57.0 AK (ACTINIC KERATOSIS): ICD-10-CM

## 2023-05-11 DIAGNOSIS — Z00.00 WELLNESS EXAMINATION: Primary | ICD-10-CM

## 2023-05-11 DIAGNOSIS — Z12.39 ENCOUNTER FOR SCREENING FOR MALIGNANT NEOPLASM OF BREAST, UNSPECIFIED SCREENING MODALITY: ICD-10-CM

## 2023-05-11 DIAGNOSIS — N18.31 CHRONIC KIDNEY DISEASE, STAGE 3A: ICD-10-CM

## 2023-05-11 DIAGNOSIS — G47.00 INSOMNIA, UNSPECIFIED TYPE: ICD-10-CM

## 2023-05-11 DIAGNOSIS — Z12.31 ENCOUNTER FOR SCREENING MAMMOGRAM FOR MALIGNANT NEOPLASM OF BREAST: ICD-10-CM

## 2023-05-11 PROCEDURE — G0439 PR MEDICARE ANNUAL WELLNESS SUBSEQUENT VISIT: ICD-10-PCS | Mod: ,,, | Performed by: FAMILY MEDICINE

## 2023-05-11 PROCEDURE — G0439 PPPS, SUBSEQ VISIT: HCPCS | Mod: ,,, | Performed by: FAMILY MEDICINE

## 2023-05-11 RX ORDER — TRAZODONE HYDROCHLORIDE 50 MG/1
TABLET ORAL
Qty: 30 TABLET | Refills: 1 | Status: SHIPPED | OUTPATIENT
Start: 2023-05-11 | End: 2023-05-11

## 2023-05-11 RX ORDER — CYCLOBENZAPRINE HCL 10 MG
TABLET ORAL
COMMUNITY
Start: 2023-05-05

## 2023-05-11 RX ORDER — AMIODARONE HYDROCHLORIDE 200 MG/1
200 TABLET ORAL DAILY
COMMUNITY
Start: 2023-05-06

## 2023-05-11 RX ORDER — TEMAZEPAM 15 MG/1
15 CAPSULE ORAL NIGHTLY PRN
Qty: 30 CAPSULE | Refills: 3 | Status: SHIPPED | OUTPATIENT
Start: 2023-05-11 | End: 2023-09-07

## 2023-05-11 NOTE — PROGRESS NOTES
"  Patient ID: [id number]     Chief Complaint: wellness, discuss ambien, skin condition on on right hand      HPI:     Farzaneh Velez is a 73 y.o. female here today for a Medicare Wellness.       Opioid Screening: Patient medication list reviewed, patient is taking prescription opioids. Patient is not using additional opioids than prescribed. Patient is at low risk of substance abuse based on this opioid use history.       ----------------------------  A-fib  Anemia  CAD (coronary artery disease)  Constipation  Cystitis  Dysuria  GERD (gastroesophageal reflux disease)  Hypertension  Hypothyroidism, unspecified  IBS (irritable bowel syndrome)  Insomnia  Interstitial cystitis  Mixed hyperlipidemia  Neuropathy  Osteopenia  Thalassemia  Urinary tract infection     Past Surgical History:   Procedure Laterality Date    cataract repair Bilateral      SECTION      COLONOSCOPY  2017    CYSTECTOMY  2023    Cyst removed from throat    removal of thyroid         Review of patient's allergies indicates:   Allergen Reactions    Tizanidine Hallucinations     Patient saw shadows & figures on the wall    Cat dander      Other reaction(s): ITCHY EYES NOSE    Latex, natural rubber      "Peeled my skin off"    Levaquin [levofloxacin] Hallucinations    Sulfamethoxazole-trimethoprim Hives       Outpatient Medications Marked as Taking for the 23 encounter (Office Visit) with Ernesto Johnson MD   Medication Sig Dispense Refill    amLODIPine (NORVASC) 10 MG tablet Take 1 tablet by mouth once daily 90 tablet 1    aspirin (ECOTRIN) 81 MG EC tablet Take 81 mg by mouth once daily.      conjugated estrogens (PREMARIN) vaginal cream Place 0.5 g vaginally twice a week. 1 applicator 2    cyclobenzaprine (FLEXERIL) 10 MG tablet TAKE 1 TABLET BY MOUTH EVERY 8 HOURS AS NEEDED FOR SPASMS      diclofenac sodium (VOLTAREN) 1 % Gel APPLY 2 GRAMS TOPICALLY TWICE DAILY AS NEEDED FOR JOINT PAIN 100 g 0    ELIQUIS 5 mg Tab Take 5 mg by " mouth 2 (two) times daily.      famotidine (PEPCID) 20 MG tablet Take 20 mg by mouth 2 (two) times daily.      fluticasone propionate (FLONASE) 50 mcg/actuation nasal spray 2 sprays by Each Nostril route as needed.      furosemide (LASIX) 20 MG tablet       gabapentin (NEURONTIN) 300 MG capsule TAKE 1 CAPSULE BY MOUTH THREE TIMES DAILY 90 capsule 0    hydrALAZINE (APRESOLINE) 25 MG tablet Take 25 mg by mouth 2 (two) times daily.      HYDROcodone-acetaminophen (NORCO) 5-325 mg per tablet Take 1 tablet by mouth.      hydrOXYzine HCL (ATARAX) 25 MG tablet TAKE 1 TABLET BY MOUTH ONE IN THE MORNING AND ONE IN THE EVENING. DO  NOT  TAKE  WITHIN  6  HOURS  OF  AMBIEN 60 tablet 0    ibuprofen (ADVIL,MOTRIN) 800 MG tablet See Instructions, TAKE 1 TABLET BY MOUTH EVERY 8 HOURS FOR 5 DAYS, # 15 tab(s), 3 Refill(s), Pharmacy: [facility], 150, cm, Height/Length Dosing, 01/27/21 13:12:00 CST, 73, kg, Weight Dosing, 01/27/21 13:12:00 CST Strength: 800 mg 15 tablet 3    levothyroxine (SYNTHROID) 100 MCG tablet Take 1 tablet (100 mcg total) by mouth once daily. (Patient taking differently: Take 75 mcg by mouth once daily.) 30 tablet 3    lovastatin (MEVACOR) 20 MG tablet Take 1 tablet (20 mg total) by mouth once daily. 90 tablet 0    sucralfate (CARAFATE) 1 gram tablet Take 1 g by mouth 4 (four) times daily.      [DISCONTINUED] zolpidem (AMBIEN) 10 mg Tab TAKE 1 TABLET BY MOUTH ONCE DAILY IN THE EVENING 30 tablet 3       Social History     Socioeconomic History    Marital status:    Tobacco Use    Smoking status: Never    Smokeless tobacco: Never   Substance and Sexual Activity    Alcohol use: Never    Drug use: Never    Sexual activity: Not Currently     Partners: Male     Birth control/protection: Post-menopausal     Social Determinants of Health     Financial Resource Strain: Low Risk     Difficulty of Paying Living Expenses: Not hard at all   Food Insecurity: No Food Insecurity    Worried About Running Out of  Food in the Last Year: Never true    Ran Out of Food in the Last Year: Never true   Physical Activity: Inactive    Days of Exercise per Week: 0 days    Minutes of Exercise per Session: 0 min   Stress: Stress Concern Present    Feeling of Stress : To some extent   Social Connections: Socially Integrated    Frequency of Communication with Friends and Family: More than three times a week    Frequency of Social Gatherings with Friends and Family: Once a week    Attends Bahai Services: 1 to 4 times per year    Active Member of Clubs or Organizations: No    Attends Club or Organization Meetings: 1 to 4 times per year    Marital Status:    Housing Stability: Unknown    Unable to Pay for Housing in the Last Year: No    Unstable Housing in the Last Year: No        Family History   Problem Relation Age of Onset    Hypertension Father     Heart attack Father     Hypertension Mother     Heart attack Mother     Thalassemia Mother     Breast cancer Other         Patient Care Team:  Ernesto Johnson MD as PCP - General (Family Medicine)  Babak Oliver MD as Consulting Physician (Ophthalmology)  Kaylie Apodaca MD (Cardiovascular Disease)  Edgar Dodge NP as Nurse Practitioner (Urology)  Surjit Angulo MD as Consulting Physician (Gastroenterology)  Osvaldo Castrejon DO (Neurosurgery)       Subjective:     Review of Systems   Constitutional: Negative.    Respiratory: Negative.     Cardiovascular: Negative.    Gastrointestinal: Negative.    Skin:         Skin lesion: located on right hand, occ pain, increasing in size, no bleeding   Psychiatric/Behavioral: Negative.          Insomnia:  Desires to change medication       Patient Reported Health Risk Assessment  What [age] 70-80  Are you male or female?: Female  During the past four weeks, how much have you been bothered by emotional problems such as feeling anxious, depressed, irritable, sad, or downhearted and blue?: Slightly  During the past five  weeks, has your physical and/or emotional health limited your social activities with family, friends, neighbors, or groups?: Not at all  During the past four weeks, how much bodily pain have you generally had?: Moderate pain  During the past four weeks, was someone available to help if you needed and wanted help?: Yes, a little  During the past four weeks, what was the hardest physical activity you could do for at least two minutes?: Very light  Can you get to places out of walking distance without help?  (For example, can you travel alone on buses or taxis, or drive your own car?): Yes  Can you go shopping for groceries or clothes without someone's help?: Yes  Can you prepare your own meals?: Yes  Can you do your own housework without help?: Yes  Because of any health problems, do you need the help of another person with your personal care needs such as eating, bathing, dressing, or getting around the house?: No  Can you handle your own money without help?: Yes  During the past four weeks, how would you rate your health in general?: Good  How have things been going for you during the past four weeks?: Good and bad parts about equal  Are you having difficulties driving your car?: No  Do you always fasten your seat belt when you are in a car?: Yes, sometimes  How often in the past four weeks have you been bothered by falling or dizzy when standing up?: Never  How often in the past four weeks have you been bothered by sexual problems?: Never  How often in the past four weeks have you been bothered by trouble eating well?: Never  How often in the past four weeks have you been bothered by teeth or denture problems?: Never  How often in the past four weeks have you been bothered with problems using the telephone?: Never  How often in the past four weeks have you been bothered by tiredness or fatigue?: Always  Have you fallen two or more times in the past year?: No  Are you afraid of falling?: No  Are you a smoker?:  "No  During the past four weeks, how many drinks of wine, beer, or other alcoholic beverages did you have?: No alcohol at all  Do you exercise for about 20 minutes three or more days a week?: No, I usually do not exercise this much  Have you been given any information to help you with hazards in your house that might hurt you?: Yes  Have you been given any information to help you with keeping track of your medications?: Yes  How often do you have trouble taking medicines the way you've been told to take them?: I always take them as prescribed  How confident are you that you can control and manage most of your health problems?: Very confident  What is your race? (Check all that apply.):     Objective:     /70 (BP Location: Left arm, Patient Position: Sitting, BP Method: Large (Manual))   Pulse 60   Temp 97.6 °F (36.4 °C)   Resp 18   Ht 4' 10" (1.473 m)   Wt 73.9 kg (163 lb)   SpO2 99%   BMI 34.07 kg/m²     Physical Exam  Constitutional:       Appearance: Normal appearance.   Cardiovascular:      Rate and Rhythm: Normal rate and regular rhythm.   Pulmonary:      Effort: Pulmonary effort is normal.      Breath sounds: Normal breath sounds.   Abdominal:      General: Abdomen is flat. Bowel sounds are normal.      Palpations: Abdomen is soft.   Musculoskeletal:         General: Normal range of motion.   Neurological:      Mental Status: She is alert.   Psychiatric:         Mood and Affect: Mood normal.         Behavior: Behavior normal.         Thought Content: Thought content normal.         Judgment: Judgment normal.     Recent Results (from the past 504 hour(s))   Comprehensive Metabolic Panel    Collection Time: 05/08/23 10:15 AM   Result Value Ref Range    Sodium Level 146 (H) 136 - 145 mmol/L    Potassium Level 4.3 3.5 - 5.1 mmol/L    Chloride 110 (H) 98 - 107 mmol/L    Carbon Dioxide 29 23 - 31 mmol/L    Glucose Level 91 82 - 115 mg/dL    Blood Urea Nitrogen 16.0 9.8 - 20.1 mg/dL    Creatinine " 1.10 (H) 0.55 - 1.02 mg/dL    Calcium Level Total 8.7 8.4 - 10.2 mg/dL    Protein Total 6.7 5.8 - 7.6 gm/dL    Albumin Level 3.7 3.4 - 4.8 g/dL    Globulin 3.0 2.4 - 3.5 gm/dL    Albumin/Globulin Ratio 1.2 1.1 - 2.0 ratio    Bilirubin Total 0.4 <=1.5 mg/dL    Alkaline Phosphatase 96 40 - 150 unit/L    Alanine Aminotransferase 15 0 - 55 unit/L    Aspartate Aminotransferase 24 5 - 34 unit/L    eGFR 53 mls/min/1.73/m2   Hepatitis C Antibody    Collection Time: 05/08/23 10:15 AM   Result Value Ref Range    Hepatitis C Antibody Nonreactive Nonreactive   HIV 1/2 Ag/Ab (4th Gen)    Collection Time: 05/08/23 10:15 AM   Result Value Ref Range    HIV Nonreactive Nonreactive   Lipid Panel    Collection Time: 05/08/23 10:15 AM   Result Value Ref Range    Cholesterol Total 186 <=200 mg/dL    HDL Cholesterol 42 35 - 60 mg/dL    Triglyceride 312 (H) 37 - 140 mg/dL    Cholesterol/HDL Ratio 4 0 - 5    Very Low Density Lipoprotein 62     LDL Cholesterol 82.00 50.00 - 140.00 mg/dL   TSH    Collection Time: 05/08/23 10:15 AM   Result Value Ref Range    Thyroid Stimulating Hormone 9.316 (H) 0.350 - 4.940 uIU/mL   CBC with Differential    Collection Time: 05/08/23 10:15 AM   Result Value Ref Range    WBC 7.35 4.50 - 11.50 x10(3)/mcL    RBC 5.91 (H) 4.20 - 5.40 x10(6)/mcL    Hgb 10.9 (L) 12.0 - 16.0 g/dL    Hct 36.8 (L) 37.0 - 47.0 %    MCV 62.3 (L) 80.0 - 94.0 fL    MCH 18.4 (L) 27.0 - 31.0 pg    MCHC 29.6 (L) 33.0 - 36.0 g/dL    RDW 15.3 11.5 - 17.0 %    Platelet 366 130 - 400 x10(3)/mcL    MPV 9.8 7.4 - 10.4 fL    Neut % 72.2 %    Lymph % 17.3 %    Mono % 6.5 %    Eos % 2.0 %    Basophil % 1.6 %    Lymph # 1.27 0.6 - 4.6 x10(3)/mcL    Neut # 5.30 2.1 - 9.2 x10(3)/mcL    Mono # 0.48 0.1 - 1.3 x10(3)/mcL    Eos # 0.15 0 - 0.9 x10(3)/mcL    Baso # 0.12 <=0.2 x10(3)/mcL    IG# 0.03 0 - 0.04 x10(3)/mcL    IG% 0.4 %    NRBC% 0.0 %          No flowsheet data found.  Fall Risk Assessment - Outpatient 5/11/2023 4/5/2023 1/5/2023 11/10/2022  "9/22/2022 7/11/2022   Mobility Status Ambulatory Ambulatory Ambulatory Ambulatory Ambulatory Ambulatory   Number of falls 0 1 1 with injury 0 0 0   Identified as fall risk 0 0 1 0 0 0           Depression Screening  Over the past two weeks, has the patient felt down, depressed, or hopeless?: No  Over the past two weeks, has the patient felt little interest or pleasure in doing things?: No  Functional Ability/Safety Screening  Was the patient's timed Up & Go test unsteady or longer than 30 seconds?: No  Does the patient need help with phone, transportation, shopping, preparing meals, housework, laundry, meds, or managing money?: No  Does the patient's home have rugs in the hallway, lack grab bars in the bathroom, lack handrails on the stairs or have poor lighting?: No  Have you noticed any hearing difficulties?: No  A "Yes" response to any of the above questions should trigger further investigation.: n  Cognitive Function (Assessed through direct observation with due consideration of information obtained by way of patient reports and/or concerns raised by family, friends, caretakers, or others)    Does the patient repeat questions/statements in the same day?: No  Does the patient have trouble remembering the date, year, and time?: No  Does the patient have difficulty managing finances?: No  Does the patient have a decreased sense of direction?: No  A "Yes" response to any of the above questions could indicate mild cognitive impairment and should trigger further investigation.: n  Assessment/Plan:     1. Wellness examination  Lab work reviewed with patient  Continue current medication  Continue diet/exercise  Advanced directive discussed with patient  Return to clinic with any concerns    2. Chronic kidney disease, stage 3a  Assessment & Plan:  Trending lab work    3. AK (actinic keratosis)  -     Ambulatory referral/consult to Dermatology; Future; Expected date: 05/18/2023  Patient to Dr. Capellan    4. Insomnia, " unspecified type  Stop Ambien   Start Restoril 15 mg q.h.s.  Sleep hygiene  Monitor   Return to clinic with any concerns    5. Encounter for screening for malignant neoplasm of breast, unspecified screening modality  -     Mammo Digital Screening Bilat w/ Artie; Future; Expected date: 05/11/2023  Schedule mammogram    6. Encounter for screening mammogram for malignant neoplasm of breast  -     Mammo Digital Screening Bilat w/ Artie; Future; Expected date: 05/11/2023           Medicare Annual Wellness and Personalized Prevention Plan:   Fall Risk + Home Safety + Hearing Impairment + Depression Screen + Opioid and Substance Abuse Screening + Cognitive Impairment Screen + Health Risk Assessment all reviewed.     Health Maintenance Topics with due status: Not Due       Topic Last Completion Date    Colorectal Cancer Screening 05/25/2017    DEXA Scan 02/28/2022    Lipid Panel 05/08/2023      The patient's Health Maintenance was reviewed and the following appears to be due at this time:   Health Maintenance Due   Topic Date Due    TETANUS VACCINE  Never done    High Dose Statin  Never done    Shingles Vaccine (1 of 2) Never done    Mammogram  02/28/2023       Advance Care Planning   I attest to discussing Advance Care Planning with patient and/or family member.  Education was provided including the importance of the Health Care Power of , Advance Directives, and/or [place] documentation.  The patient expressed understanding to the importance of this information and discussion.         Follow up in about 4 months (around 9/11/2023). In addition to their scheduled follow up, the patient has also been instructed to follow up on as needed basis.

## 2023-05-15 RX ORDER — HYDROXYZINE HYDROCHLORIDE 25 MG/1
TABLET, FILM COATED ORAL
Qty: 60 TABLET | Refills: 0 | Status: SHIPPED | OUTPATIENT
Start: 2023-05-15 | End: 2023-09-12

## 2023-05-17 ENCOUNTER — OFFICE VISIT (OUTPATIENT)
Dept: OBSTETRICS AND GYNECOLOGY | Facility: CLINIC | Age: 73
End: 2023-05-17
Payer: MEDICARE

## 2023-05-17 VITALS
DIASTOLIC BLOOD PRESSURE: 70 MMHG | WEIGHT: 163.38 LBS | SYSTOLIC BLOOD PRESSURE: 124 MMHG | BODY MASS INDEX: 34.3 KG/M2 | HEIGHT: 58 IN

## 2023-05-17 DIAGNOSIS — R30.0 DYSURIA: ICD-10-CM

## 2023-05-17 DIAGNOSIS — D21.9 FIBROID: ICD-10-CM

## 2023-05-17 DIAGNOSIS — R39.89 URETHRAL PAIN: Primary | ICD-10-CM

## 2023-05-17 DIAGNOSIS — B37.31 VULVOVAGINAL CANDIDIASIS: ICD-10-CM

## 2023-05-17 PROCEDURE — 99213 OFFICE O/P EST LOW 20 MIN: CPT | Mod: ,,, | Performed by: OBSTETRICS & GYNECOLOGY

## 2023-05-17 PROCEDURE — 99213 PR OFFICE/OUTPT VISIT, EST, LEVL III, 20-29 MIN: ICD-10-PCS | Mod: ,,, | Performed by: OBSTETRICS & GYNECOLOGY

## 2023-05-17 NOTE — PROGRESS NOTES
"  Chief Complaint     Follow up urethral burning     HPI:     Patient is a 73 y.o.  presents to follow up urethral, vulvar itching   Oneswab: +candida glabrata, +GBS, +Enterococcus- reports noticed improvement in itching after completion of Augmentin and Diflucan.    She also read Ambien can cause vaginal irritation and stopped taking it.  She has not had any irritation since.  She's spoken with her PCP and in on another med to help her sleep.  No c/o today.       TVUS done 23, scheduled for 6 week repeat TVUS.     GYN history:  MENOPAUSAL:  Age at menarche: 15  Age at menopause: 58  Hysterectomy:  No  Last pap: 2023  H/o Abnormal Pap: No   Postcoital Bleeding: No  Sexually Active: Not currently   Dyspareunia: No  H/o STI: No   HRT: vaginal premarin  MM WNL  H/o abnl MMG: No   Colonoscopy: 2017       TVUS 23:   FINDINGS:  Uterus:  Size: 6.5 x 3.1 x 3.8 cm  Masses: Multiple small calcifications are identified in the uterus as well as some cyst identified largest measuring 6 mm x 7 mm x 5 mm  Endometrium: Endometrial stripe measures 3.4 mm  Right ovary:  Was not identified  Left ovary  Size: 1.9 by 1.3 x 1.4 cm  Appearance: Normal  Vascular Flow: Difficult to Doppler the ovary  Free Fluid:  None.  Impression:  Some cyst and calcifications in the uterus with no other focal lesions identified.  Right ovary was not identified.  Left ovary is grossly unremarkable but difficult to Doppler      Per previous note 23:  Farzaneh Velez is a 72 y.o. year old  here for GYN exam.  She is currently seeing Urology for constant burning sensation of the urethra.  No records available for review today.   She was treated for UTI in November.  In December she began experiencing a constant burning of the urethra. Patient reports "My urethra burns all the time since December. I have a history of IC. I have been seeing a urologist & she suggested I see a GYN."  States has not had issues with IC for " ""years" until now. Reports, "it will hurt like I am going to have a period in my lower stomach, pelvic area.". Denies vaginal bleeding, discharge, odor. Denies any recent imaging of pelvic, ovaries.   She was started on vistaril 25mg BID; which helps alleviated some of the burning but does not fully resolve it.   She was also given an rx for vaginal premarin cream.  Says she has not noticed much difference with the cream.   Reports since December has only taken showers, changed laundry detergent to unscented, stop using soap vaginally, changed to unscented toilet paper, discontinued acidic foods/liquids/caffeine, using only cotton underwear.      Review of Systems:       Review of Systems   Constitutional:  Negative for chills and fever.   Gastrointestinal:  Negative for abdominal pain, constipation and diarrhea.   Genitourinary:  Negative for bladder incontinence, decreased libido, dysmenorrhea, dyspareunia, dysuria, flank pain, frequency, genital sores, hematuria, hot flashes, menorrhagia, menstrual problem, pelvic pain, urgency, vaginal bleeding, vaginal discharge, vaginal pain, urinary incontinence, postcoital bleeding, postmenopausal bleeding, vaginal dryness and vaginal odor.      Physical Exam:    /70 (BP Location: Right arm)   Ht 4' 10" (1.473 m)   Wt 74.1 kg (163 lb 6.4 oz)   BMI 34.15 kg/m²     Physical Exam   deferred    Assessment:   1. Urethral pain    2. Dysuria    3. Vulvovaginal candidiasis    4. Fibroid             Plan:   Pain has resolved  Agree with D/c of ambien  \  Reviewed TVUS, discussed benign nature, keep 6 weeks repeat TVUS  Will call for results and discuss plan of care following repeat TVUS  RTC prn/annual pending TVUS   "

## 2023-06-01 ENCOUNTER — HOSPITAL ENCOUNTER (OUTPATIENT)
Dept: RADIOLOGY | Facility: HOSPITAL | Age: 73
Discharge: HOME | End: 2023-06-01
Attending: OBSTETRICS & GYNECOLOGY
Payer: MEDICARE

## 2023-06-01 DIAGNOSIS — R39.89 URETHRAL PAIN: ICD-10-CM

## 2023-06-01 DIAGNOSIS — R10.30 LOWER ABDOMINAL PAIN: ICD-10-CM

## 2023-06-01 PROCEDURE — 76856 US EXAM PELVIC COMPLETE: CPT | Mod: TC

## 2023-06-02 ENCOUNTER — TELEPHONE (OUTPATIENT)
Dept: OBSTETRICS AND GYNECOLOGY | Facility: CLINIC | Age: 73
End: 2023-06-02
Payer: MEDICARE

## 2023-06-07 ENCOUNTER — TELEPHONE (OUTPATIENT)
Dept: OBSTETRICS AND GYNECOLOGY | Facility: CLINIC | Age: 73
End: 2023-06-07
Payer: MEDICARE

## 2023-06-07 NOTE — TELEPHONE ENCOUNTER
----- Message from Griffin Rodriguez MD sent at 6/6/2023  9:35 AM CDT -----  Please let her know this repeat US appears stable with several small benign appearing fibroids.  No further management needed if she is asymptomatic.

## 2023-06-19 ENCOUNTER — HOSPITAL ENCOUNTER (OUTPATIENT)
Dept: RADIOLOGY | Facility: HOSPITAL | Age: 73
Discharge: HOME | End: 2023-06-19
Attending: FAMILY MEDICINE
Payer: MEDICARE

## 2023-06-19 DIAGNOSIS — Z12.31 ENCOUNTER FOR SCREENING MAMMOGRAM FOR MALIGNANT NEOPLASM OF BREAST: ICD-10-CM

## 2023-06-19 DIAGNOSIS — Z12.31 BREAST CANCER SCREENING BY MAMMOGRAM: ICD-10-CM

## 2023-06-19 DIAGNOSIS — Z12.39 ENCOUNTER FOR SCREENING FOR MALIGNANT NEOPLASM OF BREAST, UNSPECIFIED SCREENING MODALITY: ICD-10-CM

## 2023-06-19 PROCEDURE — 77067 SCR MAMMO BI INCL CAD: CPT | Mod: 26,,, | Performed by: STUDENT IN AN ORGANIZED HEALTH CARE EDUCATION/TRAINING PROGRAM

## 2023-06-19 PROCEDURE — 77063 MAMMO DIGITAL SCREENING BILAT WITH TOMO: ICD-10-PCS | Mod: 26,,, | Performed by: STUDENT IN AN ORGANIZED HEALTH CARE EDUCATION/TRAINING PROGRAM

## 2023-06-19 PROCEDURE — 77067 MAMMO DIGITAL SCREENING BILAT WITH TOMO: ICD-10-PCS | Mod: 26,,, | Performed by: STUDENT IN AN ORGANIZED HEALTH CARE EDUCATION/TRAINING PROGRAM

## 2023-06-19 PROCEDURE — 77063 BREAST TOMOSYNTHESIS BI: CPT | Mod: 26,,, | Performed by: STUDENT IN AN ORGANIZED HEALTH CARE EDUCATION/TRAINING PROGRAM

## 2023-06-19 PROCEDURE — 77067 SCR MAMMO BI INCL CAD: CPT | Mod: TC

## 2023-06-26 ENCOUNTER — TELEPHONE (OUTPATIENT)
Dept: FAMILY MEDICINE | Facility: CLINIC | Age: 73
End: 2023-06-26
Payer: MEDICARE

## 2023-06-26 NOTE — TELEPHONE ENCOUNTER
----- Message from Ernesto Johnson MD sent at 6/26/2023 11:58 AM CDT -----  Please inform patient of lab results, which are all within acceptable ranges.

## 2023-06-26 NOTE — TELEPHONE ENCOUNTER
----- Message from Ernesto Johnson MD sent at 6/26/2023 11:54 AM CDT -----  Normal MMG. Repeat in 1 year.

## 2023-07-05 DIAGNOSIS — E78.00 PURE HYPERCHOLESTEROLEMIA: Primary | ICD-10-CM

## 2023-07-05 RX ORDER — ROSUVASTATIN CALCIUM 10 MG/1
10 TABLET, COATED ORAL DAILY
Qty: 90 TABLET | Refills: 3 | Status: SHIPPED | OUTPATIENT
Start: 2023-07-05 | End: 2024-07-04

## 2023-07-07 DIAGNOSIS — M54.41 ACUTE BILATERAL LOW BACK PAIN WITH BILATERAL SCIATICA: ICD-10-CM

## 2023-07-07 DIAGNOSIS — M54.42 ACUTE BILATERAL LOW BACK PAIN WITH BILATERAL SCIATICA: ICD-10-CM

## 2023-07-10 RX ORDER — DICLOFENAC SODIUM 10 MG/G
GEL TOPICAL
Qty: 100 G | Refills: 0 | Status: SHIPPED | OUTPATIENT
Start: 2023-07-10 | End: 2023-07-25

## 2023-07-12 DIAGNOSIS — I10 HYPERTENSION, UNSPECIFIED TYPE: Primary | ICD-10-CM

## 2023-07-13 DIAGNOSIS — I10 HYPERTENSION, UNSPECIFIED TYPE: ICD-10-CM

## 2023-07-13 RX ORDER — AMLODIPINE BESYLATE 10 MG/1
10 TABLET ORAL DAILY
Qty: 90 TABLET | Refills: 0 | Status: SHIPPED | OUTPATIENT
Start: 2023-07-13 | End: 2023-12-19 | Stop reason: SDUPTHER

## 2023-07-13 RX ORDER — AMLODIPINE BESYLATE 10 MG/1
TABLET ORAL
Qty: 90 TABLET | Refills: 0 | Status: SHIPPED | OUTPATIENT
Start: 2023-07-13 | End: 2023-07-13 | Stop reason: SDUPTHER

## 2023-08-09 DIAGNOSIS — E03.9 HYPOTHYROIDISM, UNSPECIFIED TYPE: ICD-10-CM

## 2023-08-09 RX ORDER — GABAPENTIN 300 MG/1
CAPSULE ORAL
Qty: 90 CAPSULE | Refills: 0 | Status: SHIPPED | OUTPATIENT
Start: 2023-08-09 | End: 2024-02-23 | Stop reason: SDUPTHER

## 2023-08-13 DIAGNOSIS — E03.9 HYPOTHYROIDISM, UNSPECIFIED TYPE: ICD-10-CM

## 2023-08-14 RX ORDER — LEVOTHYROXINE SODIUM 100 UG/1
100 TABLET ORAL
Qty: 30 TABLET | Refills: 0 | Status: SHIPPED | OUTPATIENT
Start: 2023-08-14 | End: 2023-09-21

## 2023-08-17 DIAGNOSIS — M54.42 ACUTE BILATERAL LOW BACK PAIN WITH BILATERAL SCIATICA: ICD-10-CM

## 2023-08-17 DIAGNOSIS — M54.41 ACUTE BILATERAL LOW BACK PAIN WITH BILATERAL SCIATICA: ICD-10-CM

## 2023-08-17 RX ORDER — DICLOFENAC SODIUM 10 MG/G
GEL TOPICAL
Qty: 100 G | Refills: 0 | Status: SHIPPED | OUTPATIENT
Start: 2023-08-17 | End: 2023-09-11

## 2023-09-06 DIAGNOSIS — G47.00 INSOMNIA, UNSPECIFIED TYPE: ICD-10-CM

## 2023-09-07 RX ORDER — TEMAZEPAM 15 MG/1
15 CAPSULE ORAL NIGHTLY PRN
Qty: 30 CAPSULE | Refills: 3 | Status: SHIPPED | OUTPATIENT
Start: 2023-09-07 | End: 2024-01-04

## 2023-09-10 DIAGNOSIS — M54.41 ACUTE BILATERAL LOW BACK PAIN WITH BILATERAL SCIATICA: ICD-10-CM

## 2023-09-10 DIAGNOSIS — M54.42 ACUTE BILATERAL LOW BACK PAIN WITH BILATERAL SCIATICA: ICD-10-CM

## 2023-09-11 RX ORDER — DICLOFENAC SODIUM 10 MG/G
GEL TOPICAL
Qty: 100 G | Refills: 0 | Status: SHIPPED | OUTPATIENT
Start: 2023-09-11 | End: 2023-10-03

## 2023-09-12 ENCOUNTER — OFFICE VISIT (OUTPATIENT)
Dept: FAMILY MEDICINE | Facility: CLINIC | Age: 73
End: 2023-09-12
Payer: MEDICARE

## 2023-09-12 VITALS
TEMPERATURE: 99 F | SYSTOLIC BLOOD PRESSURE: 120 MMHG | HEIGHT: 58 IN | DIASTOLIC BLOOD PRESSURE: 64 MMHG | WEIGHT: 164 LBS | BODY MASS INDEX: 34.43 KG/M2 | HEART RATE: 60 BPM | RESPIRATION RATE: 18 BRPM | OXYGEN SATURATION: 97 %

## 2023-09-12 DIAGNOSIS — E03.9 HYPOTHYROIDISM, UNSPECIFIED TYPE: Primary | ICD-10-CM

## 2023-09-12 PROCEDURE — 99213 PR OFFICE/OUTPT VISIT, EST, LEVL III, 20-29 MIN: ICD-10-PCS | Mod: ,,, | Performed by: FAMILY MEDICINE

## 2023-09-12 PROCEDURE — 99213 OFFICE O/P EST LOW 20 MIN: CPT | Mod: ,,, | Performed by: FAMILY MEDICINE

## 2023-09-12 NOTE — PROGRESS NOTES
"Subjective:       Patient ID: Farzaneh Velez is a 73 y.o. female.    Chief Complaint: 4 month follow up      Routine      Review of Systems   Constitutional: Negative.    Respiratory: Negative.     Cardiovascular: Negative.    Gastrointestinal: Negative.    Endocrine: Negative for cold intolerance.        Hypothyroid: tolerating medication, medication working well, patient without any complaints     Psychiatric/Behavioral: Negative.             Objective:      /64 (BP Location: Left arm, Patient Position: Sitting, BP Method: Large (Manual))   Pulse 60   Temp 98.5 °F (36.9 °C)   Resp 18   Ht 4' 10" (1.473 m)   Wt 74.4 kg (164 lb)   SpO2 97%   BMI 34.28 kg/m²    Physical Exam  Constitutional:       Appearance: Normal appearance.   Neck:      Thyroid: No thyromegaly.   Cardiovascular:      Rate and Rhythm: Normal rate and regular rhythm.      Heart sounds: Normal heart sounds.   Pulmonary:      Effort: Pulmonary effort is normal.      Breath sounds: Normal breath sounds.   Neurological:      Mental Status: She is alert.   Psychiatric:         Mood and Affect: Mood normal.         Behavior: Behavior normal.         Thought Content: Thought content normal.         Judgment: Judgment normal.               Assessment:       Problem List Items Addressed This Visit          Endocrine    Hypothyroidism - Primary          Plan:   1. Hypothyroidism, unspecified type  Controlled  Continue current Rx medication  Return to clinic with any concerns               "

## 2023-09-13 ENCOUNTER — TELEPHONE (OUTPATIENT)
Dept: OBSTETRICS AND GYNECOLOGY | Facility: CLINIC | Age: 73
End: 2023-09-13
Payer: MEDICARE

## 2023-09-13 DIAGNOSIS — N95.2 VAGINAL ATROPHY: ICD-10-CM

## 2023-09-13 DIAGNOSIS — B37.31 VAGINAL CANDIDIASIS: Primary | ICD-10-CM

## 2023-09-13 RX ORDER — FLUCONAZOLE 200 MG/1
200 TABLET ORAL
Qty: 4 TABLET | Refills: 0 | Status: SHIPPED | OUTPATIENT
Start: 2023-09-13 | End: 2023-09-20

## 2023-09-13 NOTE — TELEPHONE ENCOUNTER
Spoke with Dr Moya, refill medication and Rx Diflucan 200mg PO q other day x4 doses. Placed call to pt, voiced understanding.

## 2023-09-13 NOTE — TELEPHONE ENCOUNTER
----- Message from Alana Rider sent at 9/13/2023  2:22 PM CDT -----  Regarding: Refill/meds called out  .Type:  RX Refill Request    Who Called:  patient  Refill or New Rx: refill on premarin cream sent to [facility] in [place]  Local or Mail Order: local  Ordering Provider: eJnnifer Elliott Call Back Number: [phone]  Additional Information:  also thinks she has possible yeast infection, would like meds called out (informed she may need to come in for an appt)

## 2023-09-20 DIAGNOSIS — E03.9 HYPOTHYROIDISM, UNSPECIFIED TYPE: ICD-10-CM

## 2023-09-21 RX ORDER — LEVOTHYROXINE SODIUM 100 UG/1
100 TABLET ORAL
Qty: 30 TABLET | Refills: 3 | Status: SHIPPED | OUTPATIENT
Start: 2023-09-21 | End: 2024-01-02

## 2023-09-27 DIAGNOSIS — N18.31 CHRONIC KIDNEY DISEASE, STAGE 3A: ICD-10-CM

## 2023-09-27 DIAGNOSIS — L57.0 AK (ACTINIC KERATOSIS): Primary | ICD-10-CM

## 2023-09-27 RX ORDER — PHENAZOPYRIDINE HYDROCHLORIDE 200 MG/1
TABLET, FILM COATED ORAL
Qty: 30 TABLET | Refills: 0 | Status: SHIPPED | OUTPATIENT
Start: 2023-09-27 | End: 2023-09-27 | Stop reason: SDUPTHER

## 2023-09-27 RX ORDER — PHENAZOPYRIDINE HYDROCHLORIDE 200 MG/1
200 TABLET, FILM COATED ORAL
Qty: 9 TABLET | Refills: 0 | Status: SHIPPED | OUTPATIENT
Start: 2023-09-27

## 2023-10-03 DIAGNOSIS — M54.42 ACUTE BILATERAL LOW BACK PAIN WITH BILATERAL SCIATICA: ICD-10-CM

## 2023-10-03 DIAGNOSIS — M54.41 ACUTE BILATERAL LOW BACK PAIN WITH BILATERAL SCIATICA: ICD-10-CM

## 2023-10-03 RX ORDER — DICLOFENAC SODIUM 10 MG/G
GEL TOPICAL
Qty: 1 EACH | Refills: 0 | Status: SHIPPED | OUTPATIENT
Start: 2023-10-03 | End: 2023-10-17 | Stop reason: SDUPTHER

## 2023-10-17 DIAGNOSIS — M54.41 ACUTE BILATERAL LOW BACK PAIN WITH BILATERAL SCIATICA: ICD-10-CM

## 2023-10-17 DIAGNOSIS — M54.42 ACUTE BILATERAL LOW BACK PAIN WITH BILATERAL SCIATICA: ICD-10-CM

## 2023-10-17 RX ORDER — DICLOFENAC SODIUM 10 MG/G
GEL TOPICAL DAILY
Qty: 1 EACH | Refills: 3 | Status: SHIPPED | OUTPATIENT
Start: 2023-10-17 | End: 2023-12-04 | Stop reason: SDUPTHER

## 2023-12-04 DIAGNOSIS — M54.41 ACUTE BILATERAL LOW BACK PAIN WITH BILATERAL SCIATICA: ICD-10-CM

## 2023-12-04 DIAGNOSIS — M54.42 ACUTE BILATERAL LOW BACK PAIN WITH BILATERAL SCIATICA: ICD-10-CM

## 2023-12-04 RX ORDER — DICLOFENAC SODIUM 10 MG/G
GEL TOPICAL DAILY
Qty: 1 EACH | Refills: 3 | Status: SHIPPED | OUTPATIENT
Start: 2023-12-04 | End: 2023-12-05 | Stop reason: SDUPTHER

## 2023-12-05 DIAGNOSIS — M54.42 ACUTE BILATERAL LOW BACK PAIN WITH BILATERAL SCIATICA: ICD-10-CM

## 2023-12-05 DIAGNOSIS — M54.41 ACUTE BILATERAL LOW BACK PAIN WITH BILATERAL SCIATICA: ICD-10-CM

## 2023-12-05 RX ORDER — DICLOFENAC SODIUM 10 MG/G
GEL TOPICAL DAILY
Qty: 1 EACH | Refills: 3 | Status: SHIPPED | OUTPATIENT
Start: 2023-12-05 | End: 2023-12-07 | Stop reason: SDUPTHER

## 2023-12-07 DIAGNOSIS — M54.42 ACUTE BILATERAL LOW BACK PAIN WITH BILATERAL SCIATICA: ICD-10-CM

## 2023-12-07 DIAGNOSIS — M54.41 ACUTE BILATERAL LOW BACK PAIN WITH BILATERAL SCIATICA: ICD-10-CM

## 2023-12-07 RX ORDER — DICLOFENAC SODIUM 10 MG/G
GEL TOPICAL DAILY
Qty: 1 EACH | Refills: 3 | Status: SHIPPED | OUTPATIENT
Start: 2023-12-07

## 2023-12-07 RX ORDER — DICLOFENAC SODIUM 10 MG/G
GEL TOPICAL DAILY
Qty: 1 EACH | Refills: 3 | Status: SHIPPED | OUTPATIENT
Start: 2023-12-07 | End: 2023-12-07 | Stop reason: SDUPTHER

## 2023-12-19 DIAGNOSIS — I10 HYPERTENSION, UNSPECIFIED TYPE: ICD-10-CM

## 2023-12-19 RX ORDER — AMLODIPINE BESYLATE 10 MG/1
10 TABLET ORAL DAILY
Qty: 90 TABLET | Refills: 0 | Status: SHIPPED | OUTPATIENT
Start: 2023-12-19

## 2023-12-31 DIAGNOSIS — E03.9 HYPOTHYROIDISM, UNSPECIFIED TYPE: ICD-10-CM

## 2024-01-02 RX ORDER — LEVOTHYROXINE SODIUM 100 UG/1
100 TABLET ORAL
Qty: 30 TABLET | Refills: 0 | Status: SHIPPED | OUTPATIENT
Start: 2024-01-02 | End: 2024-01-26

## 2024-01-03 DIAGNOSIS — H00.019 HORDEOLUM EXTERNUM, UNSPECIFIED LATERALITY: Primary | ICD-10-CM

## 2024-01-03 DIAGNOSIS — G47.00 INSOMNIA, UNSPECIFIED TYPE: ICD-10-CM

## 2024-01-03 RX ORDER — TOBRAMYCIN 3 MG/ML
1 SOLUTION/ DROPS OPHTHALMIC EVERY 4 HOURS
Qty: 5 ML | Refills: 0 | Status: SHIPPED | OUTPATIENT
Start: 2024-01-03

## 2024-01-04 DIAGNOSIS — G47.00 INSOMNIA, UNSPECIFIED TYPE: ICD-10-CM

## 2024-01-04 RX ORDER — TEMAZEPAM 15 MG/1
15 CAPSULE ORAL NIGHTLY PRN
Qty: 30 CAPSULE | Refills: 1 | OUTPATIENT
Start: 2024-01-04

## 2024-01-04 RX ORDER — TEMAZEPAM 15 MG/1
15 CAPSULE ORAL NIGHTLY PRN
Qty: 30 CAPSULE | Refills: 1 | Status: SHIPPED | OUTPATIENT
Start: 2024-01-04

## 2024-01-11 RX ORDER — PROMETHAZINE HYDROCHLORIDE AND DEXTROMETHORPHAN HYDROBROMIDE 6.25; 15 MG/5ML; MG/5ML
5 SYRUP ORAL EVERY 4 HOURS PRN
Qty: 200 ML | Refills: 0 | Status: SHIPPED | OUTPATIENT
Start: 2024-01-11 | End: 2024-01-21

## 2024-01-26 DIAGNOSIS — E03.9 HYPOTHYROIDISM, UNSPECIFIED TYPE: ICD-10-CM

## 2024-01-26 RX ORDER — LEVOTHYROXINE SODIUM 100 UG/1
100 TABLET ORAL
Qty: 30 TABLET | Refills: 0 | Status: SHIPPED | OUTPATIENT
Start: 2024-01-26 | End: 2024-02-20

## 2024-02-20 DIAGNOSIS — E03.9 HYPOTHYROIDISM, UNSPECIFIED TYPE: ICD-10-CM

## 2024-02-20 RX ORDER — LEVOTHYROXINE SODIUM 100 UG/1
100 TABLET ORAL
Qty: 30 TABLET | Refills: 0 | Status: SHIPPED | OUTPATIENT
Start: 2024-02-20 | End: 2024-03-18 | Stop reason: SDUPTHER

## 2024-02-23 DIAGNOSIS — E03.9 HYPOTHYROIDISM, UNSPECIFIED TYPE: ICD-10-CM

## 2024-02-23 RX ORDER — GABAPENTIN 300 MG/1
300 CAPSULE ORAL 3 TIMES DAILY
Qty: 90 CAPSULE | Refills: 0 | Status: SHIPPED | OUTPATIENT
Start: 2024-02-23 | End: 2024-03-20

## 2024-03-06 DIAGNOSIS — G47.00 INSOMNIA, UNSPECIFIED TYPE: ICD-10-CM

## 2024-03-07 RX ORDER — TEMAZEPAM 15 MG/1
15 CAPSULE ORAL NIGHTLY PRN
Qty: 30 CAPSULE | Refills: 0 | Status: SHIPPED | OUTPATIENT
Start: 2024-03-07 | End: 2024-04-03

## 2024-03-14 ENCOUNTER — OFFICE VISIT (OUTPATIENT)
Dept: FAMILY MEDICINE | Facility: CLINIC | Age: 74
End: 2024-03-14
Payer: MEDICARE

## 2024-03-14 VITALS
OXYGEN SATURATION: 95 % | BODY MASS INDEX: 35.48 KG/M2 | SYSTOLIC BLOOD PRESSURE: 130 MMHG | RESPIRATION RATE: 18 BRPM | HEART RATE: 66 BPM | TEMPERATURE: 97 F | DIASTOLIC BLOOD PRESSURE: 60 MMHG | HEIGHT: 58 IN | WEIGHT: 169 LBS

## 2024-03-14 DIAGNOSIS — I48.91 ATRIAL FIBRILLATION, UNSPECIFIED TYPE: ICD-10-CM

## 2024-03-14 DIAGNOSIS — N18.31 CHRONIC KIDNEY DISEASE, STAGE 3A: ICD-10-CM

## 2024-03-14 DIAGNOSIS — E03.9 HYPOTHYROIDISM, UNSPECIFIED TYPE: Primary | ICD-10-CM

## 2024-03-14 DIAGNOSIS — E66.01 SEVERE OBESITY (BMI 35.0-39.9) WITH COMORBIDITY: ICD-10-CM

## 2024-03-14 PROCEDURE — 99214 OFFICE O/P EST MOD 30 MIN: CPT | Mod: ,,, | Performed by: FAMILY MEDICINE

## 2024-03-14 NOTE — PROGRESS NOTES
"Subjective:       Patient ID: Farzaneh Velez is a 73 y.o. female.    Chief Complaint: Fatigue      Fatigue    Fatigue  Associated symptoms include fatigue.       Review of Systems   Constitutional:  Positive for fatigue.   Respiratory:  Positive for shortness of breath.    Cardiovascular: Negative.    Endocrine: Positive for cold intolerance.        Hair loss   Psychiatric/Behavioral: Negative.             Objective:      /60 (BP Location: Left arm, Patient Position: Sitting, BP Method: Large (Manual))   Pulse 66   Temp 97 °F (36.1 °C)   Resp 18   Ht 4' 10" (1.473 m)   Wt 76.7 kg (169 lb)   SpO2 95%   BMI 35.32 kg/m²    Physical Exam  Constitutional:       Appearance: Normal appearance.   Neck:      Thyroid: No thyromegaly.   Cardiovascular:      Rate and Rhythm: Normal rate and regular rhythm.      Heart sounds: Normal heart sounds.   Pulmonary:      Effort: Pulmonary effort is normal.      Breath sounds: Normal breath sounds.   Neurological:      Mental Status: She is alert.   Psychiatric:         Mood and Affect: Mood normal.         Behavior: Behavior normal.         Thought Content: Thought content normal.         Judgment: Judgment normal.               Assessment:       Problem List Items Addressed This Visit          Cardiac/Vascular    Atrial fibrillation, unspecified type    Current Assessment & Plan     Followed by cardiology            Renal/    Chronic kidney disease, stage 3a    Current Assessment & Plan     Trending lab work            Endocrine    Hypothyroidism - Primary    Relevant Orders    TSH    Severe obesity (BMI 35.0-39.9) with comorbidity    Current Assessment & Plan     Reduced calorie diet modification  Frequent self weighing   Exercise/lifestyle modification                 Plan:   1. Hypothyroidism, unspecified type  -     TSH; Future; Expected date: 03/14/2024  Continue Synthroid 100 mcg q.day   Check TSH; add CBC due to fatigue   Monitor   Return to clinic with any " concerns     2. Severe obesity (BMI 35.0-39.9) with comorbidity  Assessment & Plan:  Reduced calorie diet modification  Frequent self weighing   Exercise/lifestyle modification    3. Atrial fibrillation, unspecified type  Assessment & Plan:  Followed by cardiology    4. Chronic kidney disease, stage 3a  Assessment & Plan:  Trending lab work

## 2024-03-18 ENCOUNTER — LAB VISIT (OUTPATIENT)
Dept: LAB | Facility: HOSPITAL | Age: 74
End: 2024-03-18
Attending: FAMILY MEDICINE
Payer: MEDICARE

## 2024-03-18 ENCOUNTER — TELEPHONE (OUTPATIENT)
Dept: FAMILY MEDICINE | Facility: CLINIC | Age: 74
End: 2024-03-18
Payer: MEDICARE

## 2024-03-18 DIAGNOSIS — E03.9 HYPOTHYROIDISM, UNSPECIFIED TYPE: ICD-10-CM

## 2024-03-18 LAB
ANISOCYTOSIS BLD QL SMEAR: ABNORMAL
BASOPHILS # BLD AUTO: 0.1 X10(3)/MCL
BASOPHILS NFR BLD AUTO: 1.6 %
EOSINOPHIL # BLD AUTO: 0.12 X10(3)/MCL (ref 0–0.9)
EOSINOPHIL NFR BLD AUTO: 1.9 %
ERYTHROCYTE [DISTWIDTH] IN BLOOD BY AUTOMATED COUNT: 16.2 % (ref 11.5–17)
HCT VFR BLD AUTO: 35.1 % (ref 37–47)
HGB BLD-MCNC: 10.4 G/DL (ref 12–16)
HYPOCHROMIA BLD QL SMEAR: ABNORMAL
IMM GRANULOCYTES # BLD AUTO: 0.02 X10(3)/MCL (ref 0–0.04)
IMM GRANULOCYTES NFR BLD AUTO: 0.3 %
LYMPHOCYTES # BLD AUTO: 1.42 X10(3)/MCL (ref 0.6–4.6)
LYMPHOCYTES NFR BLD AUTO: 22.4 %
MCH RBC QN AUTO: 19 PG (ref 27–31)
MCHC RBC AUTO-ENTMCNC: 29.6 G/DL (ref 33–36)
MCV RBC AUTO: 64.2 FL (ref 80–94)
MICROCYTES BLD QL SMEAR: ABNORMAL
MONOCYTES # BLD AUTO: 0.51 X10(3)/MCL (ref 0.1–1.3)
MONOCYTES NFR BLD AUTO: 8.1 %
NEUTROPHILS # BLD AUTO: 4.16 X10(3)/MCL (ref 2.1–9.2)
NEUTROPHILS NFR BLD AUTO: 65.7 %
NRBC BLD AUTO-RTO: 0 %
OVALOCYTES (OLG): ABNORMAL
PLATELET # BLD AUTO: 382 X10(3)/MCL (ref 130–400)
PLATELET # BLD EST: ADEQUATE 10*3/UL
PMV BLD AUTO: 10.1 FL (ref 7.4–10.4)
POLYCHROMASIA BLD QL SMEAR: SLIGHT
RBC # BLD AUTO: 5.47 X10(6)/MCL (ref 4.2–5.4)
STIPPLED RBC (OHS): SLIGHT
TARGETS BLD QL SMEAR: ABNORMAL
TSH SERPL-ACNC: 0.97 UIU/ML (ref 0.35–4.94)
WBC # SPEC AUTO: 6.33 X10(3)/MCL (ref 4.5–11.5)

## 2024-03-18 PROCEDURE — 84443 ASSAY THYROID STIM HORMONE: CPT

## 2024-03-18 PROCEDURE — 36415 COLL VENOUS BLD VENIPUNCTURE: CPT

## 2024-03-18 PROCEDURE — 85025 COMPLETE CBC W/AUTO DIFF WBC: CPT

## 2024-03-18 RX ORDER — LEVOTHYROXINE SODIUM 88 UG/1
88 TABLET ORAL
Qty: 30 TABLET | Refills: 3 | Status: SHIPPED | OUTPATIENT
Start: 2024-03-18 | End: 2024-07-16

## 2024-03-19 DIAGNOSIS — E03.9 HYPOTHYROIDISM, UNSPECIFIED TYPE: Primary | ICD-10-CM

## 2024-03-20 DIAGNOSIS — E03.9 HYPOTHYROIDISM, UNSPECIFIED TYPE: ICD-10-CM

## 2024-03-20 RX ORDER — GABAPENTIN 300 MG/1
300 CAPSULE ORAL 3 TIMES DAILY
Qty: 90 CAPSULE | Refills: 0 | Status: SHIPPED | OUTPATIENT
Start: 2024-03-20 | End: 2024-04-17

## 2024-04-02 DIAGNOSIS — G47.00 INSOMNIA, UNSPECIFIED TYPE: ICD-10-CM

## 2024-04-03 RX ORDER — TEMAZEPAM 15 MG/1
15 CAPSULE ORAL NIGHTLY PRN
Qty: 30 CAPSULE | Refills: 3 | Status: SHIPPED | OUTPATIENT
Start: 2024-04-03

## 2024-04-04 DIAGNOSIS — G47.00 INSOMNIA, UNSPECIFIED TYPE: ICD-10-CM

## 2024-04-04 RX ORDER — TEMAZEPAM 15 MG/1
15 CAPSULE ORAL NIGHTLY PRN
Qty: 30 CAPSULE | Refills: 3 | OUTPATIENT
Start: 2024-04-04

## 2024-04-12 DIAGNOSIS — N18.31 CHRONIC KIDNEY DISEASE, STAGE 3A: ICD-10-CM

## 2024-04-12 RX ORDER — PHENAZOPYRIDINE HYDROCHLORIDE 200 MG/1
200 TABLET, FILM COATED ORAL
Qty: 9 TABLET | Refills: 0 | Status: SHIPPED | OUTPATIENT
Start: 2024-04-12

## 2024-04-17 DIAGNOSIS — E03.9 HYPOTHYROIDISM, UNSPECIFIED TYPE: ICD-10-CM

## 2024-04-17 RX ORDER — GABAPENTIN 300 MG/1
300 CAPSULE ORAL 3 TIMES DAILY
Qty: 90 CAPSULE | Refills: 0 | Status: SHIPPED | OUTPATIENT
Start: 2024-04-17

## 2024-04-17 NOTE — PROGRESS NOTES
Chief Complaint     Dysuria    HPI:     Patient is a 73 y.o.  presents today with complaints of vaginal irritation since 24. She has mild dysuria associated with the irritation. Denies abnormal discharge, vaginal bleeding, odor.     MENOPAUSAL:  Age at menarche: 15  Age at menopause: 58  Hysterectomy:  No  Last pap: 2023 NIL  H/o Abnormal Pap: No   Postcoital Bleeding: No  Sexually Active: Not currently   Dyspareunia: No  H/o STI: No   HRT: vaginal premarin  MM23 WNL  H/o abnl MMG: No   Colonoscopy: 2017       Past Medical History:   Diagnosis Date    A-fib     Anemia     CAD (coronary artery disease)     Constipation     Cystitis     Dysuria     GERD (gastroesophageal reflux disease)     Hypertension     Hypothyroidism, unspecified     IBS (irritable bowel syndrome)     Insomnia     Interstitial cystitis     Mixed hyperlipidemia     Neuropathy     Osteopenia     Thalassemia     Urinary tract infection        Past Surgical History:   Procedure Laterality Date    cataract repair Bilateral      SECTION      COLONOSCOPY  2017    CYSTECTOMY  2023    Cyst removed from throat    removal of thyroid         Family History   Problem Relation Name Age of Onset    Hypertension Father      Heart attack Father      Hypertension Mother      Heart attack Mother      Thalassemia Mother      Breast cancer Other Maternal cousin         onset unknown       OB History          3    Para   3    Term   3            AB   0    Living   3         SAB   0    IAB        Ectopic        Multiple        Live Births   3                 Current Outpatient Medications on File Prior to Visit   Medication Sig Dispense Refill    amiodarone (PACERONE) 200 MG Tab Take 200 mg by mouth once daily.      amLODIPine (NORVASC) 10 MG tablet Take 1 tablet (10 mg total) by mouth once daily. 90 tablet 0    conjugated estrogens (PREMARIN) vaginal cream Place 0.5 g vaginally once daily. 1 applicator 5     cyclobenzaprine (FLEXERIL) 10 MG tablet TAKE 1 TABLET BY MOUTH EVERY 8 HOURS AS NEEDED FOR SPASMS      diclofenac sodium (VOLTAREN) 1 % Gel Apply topically once daily. 1 each 3    ELIQUIS 5 mg Tab Take 5 mg by mouth 2 (two) times daily.      famotidine (PEPCID) 20 MG tablet Take 20 mg by mouth 2 (two) times daily.      fluticasone propionate (FLONASE) 50 mcg/actuation nasal spray 2 sprays by Each Nostril route as needed.      furosemide (LASIX) 20 MG tablet       gabapentin (NEURONTIN) 300 MG capsule TAKE 1 CAPSULE BY MOUTH THREE TIMES DAILY 90 capsule 0    hydrALAZINE (APRESOLINE) 25 MG tablet Take 25 mg by mouth 2 (two) times daily.      levothyroxine (SYNTHROID) 88 MCG tablet Take 1 tablet (88 mcg total) by mouth before breakfast. 30 tablet 3    phenazopyridine (PYRIDIUM) 200 MG tablet Take 1 tablet (200 mg total) by mouth 3 (three) times daily with meals. 9 tablet 0    rosuvastatin (CRESTOR) 10 MG tablet Take 1 tablet (10 mg total) by mouth once daily. 90 tablet 3    sucralfate (CARAFATE) 1 gram tablet Take 1 g by mouth 4 (four) times daily.      temazepam (RESTORIL) 15 mg Cap TAKE 1 CAPSULE BY MOUTH NIGHTLY AS NEEDED FOR INSOMNIA 30 capsule 3    aspirin (ECOTRIN) 81 MG EC tablet Take 81 mg by mouth once daily. (Patient not taking: Reported on 4/18/2024)       No current facility-administered medications on file prior to visit.       Review of Systems:       Review of Systems   Constitutional:  Negative for chills and fever.   Gastrointestinal:  Negative for abdominal pain, constipation and diarrhea.   Genitourinary:  Positive for dysuria. Negative for bladder incontinence, decreased libido, dysmenorrhea, dyspareunia, flank pain, frequency, genital sores, hematuria, hot flashes, menorrhagia, menstrual problem, pelvic pain, urgency, vaginal bleeding, vaginal discharge, vaginal pain, urinary incontinence, postcoital bleeding, postmenopausal bleeding, vaginal dryness and vaginal odor.        Physical Exam:    BP  "122/74 (BP Location: Left arm, Patient Position: Sitting)   Ht 4' 10" (1.473 m)   Wt 76.2 kg (168 lb)   BMI 35.11 kg/m²     Physical Exam   General Exam:    General Appearance: alert, in no acute distress, normal, well nourished.  Psych:  Orientation: time, place, person.  Mood/Affect: Normal   Abdomen:  - Soft. Non-tender. No rebound tenderness or guardingNo masses. Liver normal. Spleen normal. No hernia palpable.  Pelvis:   - Vulva: mild erythema noted, vaginal atrophy   - Perianal skin: Normal   - Urethra: Normal meatus. Q-tip: Not performed   - Vagina:Vaginal atrophy.  Small thick white discharge.   - Cervix: Normal. Cervical motion tenderness Absent         Assessment:   1. Vaginal irritation  -     POCT Wet Prep    2. Vaginal atrophy    3. Burning with urination  -     POCT Urinalysis, Dipstick, Automated, W/O Scope    Other orders  -     fluconazole (DIFLUCAN) 200 MG Tab; Take 1 tablet (200 mg total) by mouth every other day. for 2 doses  Dispense: 2 tablet; Refill: 0             Plan:       UA: negative  Wet prep: small amount of yeast   Rx Diflucan   To call if no improvement     Recommend restart vaginal premarin cream    RTC PRN/ANNUAL     This note was transcribed by Pamela Brunson. There may be transcription errors as a result, however minimal. Effort has been made to ensure accuracy of transcription, but any obvious errors or omissions should be clarified with the author of the document.       "

## 2024-04-18 ENCOUNTER — OFFICE VISIT (OUTPATIENT)
Dept: OBSTETRICS AND GYNECOLOGY | Facility: CLINIC | Age: 74
End: 2024-04-18
Payer: MEDICARE

## 2024-04-18 ENCOUNTER — TELEPHONE (OUTPATIENT)
Dept: OBSTETRICS AND GYNECOLOGY | Facility: CLINIC | Age: 74
End: 2024-04-18

## 2024-04-18 VITALS
HEIGHT: 58 IN | BODY MASS INDEX: 35.26 KG/M2 | WEIGHT: 168 LBS | SYSTOLIC BLOOD PRESSURE: 122 MMHG | DIASTOLIC BLOOD PRESSURE: 74 MMHG

## 2024-04-18 DIAGNOSIS — N95.2 VAGINAL ATROPHY: ICD-10-CM

## 2024-04-18 DIAGNOSIS — N89.8 VAGINAL IRRITATION: Primary | ICD-10-CM

## 2024-04-18 DIAGNOSIS — R30.0 BURNING WITH URINATION: ICD-10-CM

## 2024-04-18 LAB
BILIRUB UR QL STRIP: NEGATIVE
GARDNERELLA VAGINALIS: ABNORMAL
GLUCOSE UR QL STRIP: NEGATIVE
KETONES UR QL STRIP: NEGATIVE
LEUKOCYTE ESTERASE UR QL STRIP: NEGATIVE
OTHER MICROSC. OBSERVATIONS: ABNORMAL
PH, POC UA: 6
POC BACTERIAL VAGINOSIS: ABNORMAL
POC BLOOD, URINE: NEGATIVE
POC CLUE CELLS: ABNORMAL
POC NITRATES, URINE: NEGATIVE
PROT UR QL STRIP: NEGATIVE
SP GR UR STRIP: 1.02 (ref 1–1.03)
TRICHOMONAS, POC: ABNORMAL
UROBILINOGEN UR STRIP-ACNC: 0.2 (ref 0.1–1.1)
YEAST WET PREP: POSITIVE

## 2024-04-18 PROCEDURE — 99213 OFFICE O/P EST LOW 20 MIN: CPT | Mod: ,,, | Performed by: OBSTETRICS & GYNECOLOGY

## 2024-04-18 PROCEDURE — 81003 URINALYSIS AUTO W/O SCOPE: CPT | Mod: QW,RHCUB | Performed by: OBSTETRICS & GYNECOLOGY

## 2024-04-18 RX ORDER — FLUCONAZOLE 200 MG/1
200 TABLET ORAL EVERY OTHER DAY
Qty: 2 TABLET | Refills: 0 | Status: SHIPPED | OUTPATIENT
Start: 2024-04-18 | End: 2024-04-21

## 2024-04-18 NOTE — TELEPHONE ENCOUNTER
"Per Dr. Rodriguez "They can fill the diflucan. Advised pt if she should experience any palpitations and/or chest discomfort to report to nearest ER."  "

## 2024-04-18 NOTE — TELEPHONE ENCOUNTER
----- Message from Temi Bailey sent at 4/18/2024  2:56 PM CDT -----  Regarding: Pharmacy Authorizathin  Contact: [facility]  Type:  Pharmacy Calling to Clarify an RX    1. Name of Caller:NA  2. Pharmacy Name:  [facility]  [address] [place] [address]  Phone: [phone] Fax: [phone]  3. Prescription Name:Amiodarone with  diflucan  4. What do they need to clarify?:Drug Interaction- need approval

## 2024-04-22 DIAGNOSIS — I10 HYPERTENSION, UNSPECIFIED TYPE: ICD-10-CM

## 2024-04-22 DIAGNOSIS — E78.5 HYPERLIPIDEMIA, UNSPECIFIED HYPERLIPIDEMIA TYPE: ICD-10-CM

## 2024-04-22 DIAGNOSIS — Z13.6 SCREENING FOR ISCHEMIC HEART DISEASE: ICD-10-CM

## 2024-04-22 DIAGNOSIS — Z00.00 WELLNESS EXAMINATION: ICD-10-CM

## 2024-04-22 DIAGNOSIS — E03.9 HYPOTHYROIDISM, UNSPECIFIED TYPE: ICD-10-CM

## 2024-04-22 DIAGNOSIS — Z11.4 ENCOUNTER FOR HIV (HUMAN IMMUNODEFICIENCY VIRUS) TEST: Primary | ICD-10-CM

## 2024-04-22 DIAGNOSIS — Z11.59 NEED FOR HEPATITIS C SCREENING TEST: ICD-10-CM

## 2024-07-29 ENCOUNTER — OFFICE VISIT (OUTPATIENT)
Dept: FAMILY MEDICINE | Facility: CLINIC | Age: 74
End: 2024-07-29
Payer: MEDICARE

## 2024-07-29 VITALS
HEIGHT: 58 IN | WEIGHT: 166 LBS | SYSTOLIC BLOOD PRESSURE: 122 MMHG | DIASTOLIC BLOOD PRESSURE: 62 MMHG | HEART RATE: 59 BPM | RESPIRATION RATE: 20 BRPM | BODY MASS INDEX: 34.85 KG/M2 | TEMPERATURE: 98 F

## 2024-07-29 DIAGNOSIS — E66.9 CLASS 1 OBESITY WITH BODY MASS INDEX (BMI) OF 34.0 TO 34.9 IN ADULT, UNSPECIFIED OBESITY TYPE, UNSPECIFIED WHETHER SERIOUS COMORBIDITY PRESENT: ICD-10-CM

## 2024-07-29 DIAGNOSIS — N89.8 VAGINAL ITCHING: ICD-10-CM

## 2024-07-29 DIAGNOSIS — B37.31 VAGINAL YEAST INFECTION: Primary | ICD-10-CM

## 2024-07-29 DIAGNOSIS — I25.10 CORONARY ARTERY DISEASE, UNSPECIFIED VESSEL OR LESION TYPE, UNSPECIFIED WHETHER ANGINA PRESENT, UNSPECIFIED WHETHER NATIVE OR TRANSPLANTED HEART: ICD-10-CM

## 2024-07-29 DIAGNOSIS — E78.5 HYPERLIPIDEMIA, UNSPECIFIED HYPERLIPIDEMIA TYPE: ICD-10-CM

## 2024-07-29 DIAGNOSIS — K21.9 GASTROESOPHAGEAL REFLUX DISEASE, UNSPECIFIED WHETHER ESOPHAGITIS PRESENT: ICD-10-CM

## 2024-07-29 DIAGNOSIS — E03.9 HYPOTHYROIDISM, UNSPECIFIED TYPE: ICD-10-CM

## 2024-07-29 DIAGNOSIS — I10 HYPERTENSION, UNSPECIFIED TYPE: ICD-10-CM

## 2024-07-29 DIAGNOSIS — H57.89 THYROID EYE DISEASE: ICD-10-CM

## 2024-07-29 DIAGNOSIS — E07.9 THYROID EYE DISEASE: ICD-10-CM

## 2024-07-29 DIAGNOSIS — R05.9 COUGH, UNSPECIFIED TYPE: ICD-10-CM

## 2024-07-29 DIAGNOSIS — M54.2 NECK PAIN: ICD-10-CM

## 2024-07-29 PROBLEM — E66.811 CLASS 1 OBESITY WITH BODY MASS INDEX (BMI) OF 34.0 TO 34.9 IN ADULT: Status: ACTIVE | Noted: 2024-03-14

## 2024-07-29 PROBLEM — E66.01 SEVERE OBESITY (BMI 35.0-39.9) WITH COMORBIDITY: Status: RESOLVED | Noted: 2024-03-14 | Resolved: 2024-07-29

## 2024-07-29 LAB
CTP QC/QA: YES
SARS-COV-2 AG RESP QL IA.RAPID: NEGATIVE

## 2024-07-29 PROCEDURE — 87811 SARS-COV-2 COVID19 W/OPTIC: CPT | Mod: QW,RHCUB | Performed by: NURSE PRACTITIONER

## 2024-07-29 PROCEDURE — 99214 OFFICE O/P EST MOD 30 MIN: CPT | Mod: ,,, | Performed by: NURSE PRACTITIONER

## 2024-07-29 RX ORDER — CYCLOSPORINE 0.5 MG/ML
1 EMULSION OPHTHALMIC 2 TIMES DAILY
Qty: 1 EACH | Refills: 11 | Status: SHIPPED | OUTPATIENT
Start: 2024-07-29 | End: 2025-07-29

## 2024-07-29 RX ORDER — ROSUVASTATIN CALCIUM 5 MG/1
5 TABLET, COATED ORAL NIGHTLY
COMMUNITY
Start: 2024-07-25

## 2024-07-29 RX ORDER — ASPIRIN 325 MG
1 TABLET, DELAYED RELEASE (ENTERIC COATED) ORAL NIGHTLY
Qty: 8 G | Refills: 0 | Status: SHIPPED | OUTPATIENT
Start: 2024-07-29 | End: 2024-08-05

## 2024-07-29 RX ORDER — FLUCONAZOLE 150 MG/1
150 TABLET ORAL DAILY
Qty: 7 TABLET | Refills: 0 | Status: SHIPPED | OUTPATIENT
Start: 2024-07-29 | End: 2024-07-29

## 2024-07-29 NOTE — PROGRESS NOTES
Subjective:       Patient ID: Farzaneh Velez is a 74 y.o. female here today for annual wellness visit.     Chief Complaint: Establish Care (Establish PCP) and Vaginal Itching (Itching, burning X's 3 days)    The patient is 74-year-old female with multiple comorbid conditions.  The patient has a history of the following:  Coronary artery disease, hyperlipidemia, hypertension, atrial fibrillation, neuropathy, hypothyroidism, thyroid eye disease, obesity, and GERD.    She is complaining about vaginal itching.  I offered to send her 1 dose of fluconazole.  She said that never work she needs 7 days.     She is complaining about neck pain.  She was seeing a neurologist who told her it had to do with the nerve ending and that he could help her with but she lost the coverage for this physician.  She would like Neurology referred to [facility].  She also said her physical therapist was concerned about giving her some therapy for her neck because they were afraid that she had carotid blockage.  I listened with Doppler and the patient has a 56 pulse in the carotid without audible turbulence.    Patient has thyroid eye disease and would like to try Restasis              Neck Pain   This is a chronic problem. The current episode started more than 1 month ago. The problem occurs constantly. The problem has been unchanged. The pain is associated with nothing. The pain is present in the left side. The quality of the pain is described as burning and aching. The pain is at a severity of 9/10. The pain is severe. The symptoms are aggravated by bending, position, twisting and coughing. The pain is Same all the time. Stiffness is present All day and at night. Associated symptoms include headaches. She has tried neck support, heat, ice and muscle relaxants for the symptoms. The treatment provided no relief.       Review of Systems   Musculoskeletal:  Positive for neck pain.   Neurological:  Positive for headaches.    12 point  "review of systems conducted, negative except as stated in the history of present illness. See HPI for details.        Objective:        Visit Vitals  /62   Pulse (!) 59   Temp 97.9 °F (36.6 °C)   Resp 20   Ht 4' 10" (1.473 m)   Wt 75.3 kg (166 lb)   BMI 34.69 kg/m²        Physical Exam  Vitals and nursing note reviewed.   Constitutional:       Appearance: She is obese.   HENT:      Head: Normocephalic.      Right Ear: Tympanic membrane normal.      Left Ear: Tympanic membrane normal.      Nose: Nose normal.      Mouth/Throat:      Mouth: Mucous membranes are moist.   Eyes:      Extraocular Movements: Extraocular movements intact.   Cardiovascular:      Rate and Rhythm: Normal rate and regular rhythm.      Heart sounds: No murmur heard.  Pulmonary:      Effort: Pulmonary effort is normal.      Breath sounds: Normal breath sounds.      Comments: Complaining of a dry cough  Abdominal:      General: Bowel sounds are normal.      Palpations: Abdomen is soft.   Musculoskeletal:      Cervical back: Normal range of motion.      Comments: Left neck pain with limited range of motion when turning neck to left   Skin:     General: Skin is warm and dry.   Neurological:      Mental Status: She is alert and oriented to person, place, and time.           Assessment:         ICD-10-CM ICD-9-CM   1. Coronary artery disease, unspecified vessel or lesion type, unspecified whether angina present, unspecified whether native or transplanted heart  I25.10 414.00   2. Hyperlipidemia, unspecified hyperlipidemia type  E78.5 272.4   3. Hypertension, unspecified type  I10 401.9   4. Hypothyroidism, unspecified type  E03.9 244.9   5. Gastroesophageal reflux disease, unspecified whether esophagitis present  K21.9 530.81   6. Class 1 obesity with body mass index (BMI) of 34.0 to 34.9 in adult, unspecified obesity type, unspecified whether serious comorbidity present  E66.9 278.00    Z68.34 V85.34   7. Vaginal itching  N89.8 698.1   8. Neck " pain  M54.2 723.1   9. Thyroid eye disease  H57.89 376.89    E07.9 246.9   10. Cough, unspecified type  R05.9 786.2            Plan:       1. Coronary artery disease, unspecified vessel or lesion type, unspecified whether angina present, unspecified whether native or transplanted heart  - Ambulatory referral/consult to Cardiology; Future    2. Hyperlipidemia, unspecified hyperlipidemia type  - Ambulatory referral/consult to Cardiology; Future    3. Hypertension, unspecified type  - Ambulatory referral/consult to Cardiology; Future    4. Hypothyroidism, unspecified type  Hypothyroid  Controlled  Continue current medication  Return to clinic with any concerns    5. Gastroesophageal reflux disease, unspecified whether esophagitis present  Controlled  Continue current medication regimen    6. Class 1 obesity with body mass index (BMI) of 34.0 to 34.9 in adult, unspecified obesity type, unspecified whether serious comorbidity present  Obesity  Reduced calorie diet modification  Frequent self weighing discussed  Exercise/lifestyle modification    7. Vaginal itching  - fluconazole (DIFLUCAN) 150 MG Tab; Take 1 tablet (150 mg total) by mouth once daily. for 7 days  Dispense: 7 tablet; Refill: 0    8. Neck pain  May continue physical therapy    9. Thyroid eye disease  - cycloSPORINE (RESTASIS) 0.05 % ophthalmic emulsion; Place 1 drop into both eyes 2 (two) times daily.  Dispense: 1 each; Refill: 11    10. Cough, unspecified type  May use over-the-counter Robitussin  - SARS Coronavirus 2 Antigen, POCT Manual Read-negative          Vaccinations -   Immunization History   Administered Date(s) Administered    COVID-19 MRNA, LN-S PF (MODERNA HALF 0.25 ML DOSE) 10/28/2021    COVID-19, MRNA, LN-S, PF (MODERNA FULL 0.5 ML DOSE) 02/26/2021, 02/26/2021, 03/30/2021, 03/30/2021, 10/28/2021    COVID-19, mRNA, LNP-S, PF, suzie-sucrose, 30 mcg/0.3 mL (Pfizer 2023 Ages 12+) 02/23/2024    COVID-19, mRNA, LNP-S, bivalent booster, PF (PFIZER  "OMICRON) 10/19/2022    Influenza - High Dose - PF (65 years and older) 10/14/2017, 10/05/2018, 10/25/2019    Influenza - Quadrivalent - High Dose - PF (65 years and older) 10/14/2020, 10/28/2021, 10/19/2022, 12/13/2023    Influenza - Trivalent - PF (ADULT) 09/27/2016, 10/14/2017, 10/25/2019    Pneumococcal Conjugate - 13 Valent 10/27/2016    Pneumococcal Conjugate - 20 Valent 10/19/2022    Pneumococcal Polysaccharide - 23 Valent 10/14/2017, 10/14/2017    RSVpreF (Arexvy) 12/13/2023              No follow-ups on file.     Future Appointments   Date Time Provider Department Center   4/23/2025  1:00 PM Griffin Rodriguez MD [facility]        Past Medical History:   Diagnosis Date    A-fib     Anemia     CAD (coronary artery disease)     Constipation     Cystitis     Dysuria     GERD (gastroesophageal reflux disease)     Hypertension     Hypothyroidism, unspecified     IBS (irritable bowel syndrome)     Insomnia     Interstitial cystitis     Mixed hyperlipidemia     Neuropathy     Osteopenia     Thalassemia     Urinary tract infection         Review of patient's allergies indicates:   Allergen Reactions    Tizanidine Hallucinations     Patient saw shadows & figures on the wall    Cat dander      Other reaction(s): ITCHY EYES NOSE    Latex, natural rubber      "Peeled my skin off"    Levaquin [levofloxacin] Hallucinations    Sulfamethoxazole-trimethoprim Hives        Current Outpatient Medications   Medication Instructions    amiodarone (PACERONE) 200 mg, Oral, Daily    amLODIPine (NORVASC) 10 mg, Oral, Daily    conjugated estrogens (PREMARIN) 0.5 g, Vaginal, Daily    cycloSPORINE (RESTASIS) 0.05 % ophthalmic emulsion 1 drop, Both Eyes, 2 times daily    diclofenac sodium (VOLTAREN) 1 % Gel Topical (Top), Daily    ELIQUIS 5 mg, Oral, 2 times daily    famotidine (PEPCID) 20 mg, Oral, 2 times daily    fluconazole (DIFLUCAN) 150 mg, Oral, Daily    fluticasone propionate (FLONASE) 50 mcg/actuation nasal spray 2 sprays, " Each Nostril, As needed (PRN)    furosemide (LASIX) 20 MG tablet No dose, route, or frequency recorded.    gabapentin (NEURONTIN) 300 mg, Oral, 3 times daily    hydrALAZINE (APRESOLINE) 25 mg, Oral, 2 times daily    levothyroxine (SYNTHROID) 88 mcg, Oral, Before breakfast    rosuvastatin (CRESTOR) 5 mg, Oral, Nightly          Patient Active Problem List   Diagnosis    CAD (coronary artery disease)    Gastroesophageal reflux disease    Hyperlipidemia    Hypertension    Insomnia    Neuropathy    Hypothyroidism    Osteopenia    Dysuria    Acute cystitis without hematuria    Atrial fibrillation, unspecified type    Chronic kidney disease, stage 3a    Fibroid    Vagina, candidiasis    Urethral pain    Class 1 obesity with body mass index (BMI) of 34.0 to 34.9 in adult    Vaginal atrophy    Vaginal itching    Neck pain    Thyroid eye disease    Cough             Past Medical History:   Diagnosis Date    A-fib     Anemia     CAD (coronary artery disease)     Constipation     Cystitis     Dysuria     GERD (gastroesophageal reflux disease)     Hypertension     Hypothyroidism, unspecified     IBS (irritable bowel syndrome)     Insomnia     Interstitial cystitis     Mixed hyperlipidemia     Neuropathy     Osteopenia     Thalassemia     Urinary tract infection           Past Surgical History:   Procedure Laterality Date    BACK SURGERY      cataract repair Bilateral      SECTION      COLONOSCOPY  2017    CYSTECTOMY  2023    Cyst removed from throat    removal of thyroid             reports that she has never smoked. She has never been exposed to tobacco smoke. She has never used smokeless tobacco. She reports that she does not drink alcohol and does not use drugs.    Health Maintenance   Topic Date Due    TETANUS VACCINE  Never done    High Dose Statin  Never done    Shingles Vaccine (1 of 2) Never done    Mammogram  2024    DEXA Scan  2025    Colorectal Cancer Screening  2027    Lipid  Panel  05/08/2029    Hepatitis C Screening  Completed

## 2024-07-31 RX ORDER — PROMETHAZINE HYDROCHLORIDE AND DEXTROMETHORPHAN HYDROBROMIDE 6.25; 15 MG/5ML; MG/5ML
5 SYRUP ORAL EVERY 4 HOURS PRN
COMMUNITY
End: 2024-07-31 | Stop reason: SDUPTHER

## 2024-07-31 RX ORDER — PROMETHAZINE HYDROCHLORIDE AND DEXTROMETHORPHAN HYDROBROMIDE 6.25; 15 MG/5ML; MG/5ML
5 SYRUP ORAL EVERY 4 HOURS PRN
Qty: 118 ML | Refills: 0 | Status: SHIPPED | OUTPATIENT
Start: 2024-07-31

## 2024-08-05 DIAGNOSIS — R09.81 SINUS CONGESTION: Primary | ICD-10-CM

## 2024-08-05 RX ORDER — MONTELUKAST SODIUM 10 MG/1
10 TABLET ORAL NIGHTLY
Qty: 30 TABLET | Refills: 6 | Status: SHIPPED | OUTPATIENT
Start: 2024-08-05 | End: 2025-03-03

## 2024-08-06 ENCOUNTER — CLINICAL SUPPORT (OUTPATIENT)
Dept: FAMILY MEDICINE | Facility: CLINIC | Age: 74
End: 2024-08-06
Payer: MEDICARE

## 2024-08-06 DIAGNOSIS — R09.81 SINUS CONGESTION: Primary | ICD-10-CM

## 2024-08-06 PROCEDURE — 96372 THER/PROPH/DIAG INJ SC/IM: CPT | Mod: ,,, | Performed by: NURSE PRACTITIONER

## 2024-08-06 RX ORDER — TEMAZEPAM 15 MG/1
15 CAPSULE ORAL NIGHTLY PRN
COMMUNITY
End: 2024-08-06 | Stop reason: SDUPTHER

## 2024-08-06 RX ORDER — TEMAZEPAM 15 MG/1
15 CAPSULE ORAL NIGHTLY PRN
Qty: 30 CAPSULE | Refills: 1 | Status: SHIPPED | OUTPATIENT
Start: 2024-08-06

## 2024-08-06 RX ORDER — METHYLPREDNISOLONE ACETATE 40 MG/ML
40 INJECTION, SUSPENSION INTRA-ARTICULAR; INTRALESIONAL; INTRAMUSCULAR; SOFT TISSUE
Status: COMPLETED | OUTPATIENT
Start: 2024-08-06 | End: 2024-08-06

## 2024-08-06 RX ADMIN — METHYLPREDNISOLONE ACETATE 40 MG: 40 INJECTION, SUSPENSION INTRA-ARTICULAR; INTRALESIONAL; INTRAMUSCULAR; SOFT TISSUE at 11:08

## 2024-10-03 RX ORDER — TEMAZEPAM 15 MG/1
15 CAPSULE ORAL NIGHTLY PRN
Qty: 30 CAPSULE | Refills: 4 | Status: SHIPPED | OUTPATIENT
Start: 2024-10-03

## 2024-10-29 ENCOUNTER — OFFICE VISIT (OUTPATIENT)
Dept: FAMILY MEDICINE | Facility: CLINIC | Age: 74
End: 2024-10-29
Payer: MEDICARE

## 2024-10-29 VITALS
TEMPERATURE: 98 F | HEIGHT: 58 IN | WEIGHT: 167 LBS | SYSTOLIC BLOOD PRESSURE: 138 MMHG | BODY MASS INDEX: 35.05 KG/M2 | DIASTOLIC BLOOD PRESSURE: 59 MMHG | RESPIRATION RATE: 20 BRPM | HEART RATE: 56 BPM

## 2024-10-29 DIAGNOSIS — M54.2 NECK PAIN: Primary | ICD-10-CM

## 2024-10-29 DIAGNOSIS — E66.811 OBESITY (BMI 30.0-34.9): ICD-10-CM

## 2024-10-29 DIAGNOSIS — R53.83 FATIGUE, UNSPECIFIED TYPE: ICD-10-CM

## 2024-10-29 PROCEDURE — 99214 OFFICE O/P EST MOD 30 MIN: CPT | Mod: ,,, | Performed by: NURSE PRACTITIONER

## 2024-11-01 ENCOUNTER — CLINICAL SUPPORT (OUTPATIENT)
Dept: FAMILY MEDICINE | Facility: CLINIC | Age: 74
End: 2024-11-01
Payer: MEDICARE

## 2024-11-01 DIAGNOSIS — E66.811 OBESITY (BMI 30.0-34.9): ICD-10-CM

## 2024-11-01 DIAGNOSIS — R53.83 FATIGUE, UNSPECIFIED TYPE: ICD-10-CM

## 2024-11-01 LAB — TSH SERPL-ACNC: 3.58 UIU/ML (ref 0.35–4.94)

## 2024-11-01 PROCEDURE — 84443 ASSAY THYROID STIM HORMONE: CPT | Performed by: NURSE PRACTITIONER

## 2024-11-01 PROCEDURE — 84439 ASSAY OF FREE THYROXINE: CPT | Performed by: NURSE PRACTITIONER

## 2024-11-01 PROCEDURE — 84481 FREE ASSAY (FT-3): CPT | Performed by: NURSE PRACTITIONER

## 2024-11-01 PROCEDURE — 36415 COLL VENOUS BLD VENIPUNCTURE: CPT

## 2024-11-02 LAB
T3FREE SERPL-MCNC: 2.32 PG/ML (ref 1.58–3.91)
T4 FREE SERPL-MCNC: 1.3 NG/DL (ref 0.7–1.48)

## 2024-11-05 ENCOUNTER — TELEPHONE (OUTPATIENT)
Dept: FAMILY MEDICINE | Facility: CLINIC | Age: 74
End: 2024-11-05
Payer: MEDICARE

## 2024-11-19 DIAGNOSIS — M54.40 LOW BACK PAIN WITH SCIATICA, SCIATICA LATERALITY UNSPECIFIED, UNSPECIFIED BACK PAIN LATERALITY, UNSPECIFIED CHRONICITY: Primary | ICD-10-CM

## 2024-11-26 ENCOUNTER — TELEPHONE (OUTPATIENT)
Dept: PAIN MEDICINE | Facility: CLINIC | Age: 74
End: 2024-11-26
Payer: MEDICARE

## 2024-11-26 NOTE — TELEPHONE ENCOUNTER
Returned phone call. Patient was making sure that her insurance was in network.    ----- Message from Nelia sent at 11/26/2024 10:30 AM CST -----  Contact: self  Patient is requesting a call back regarding appt. Please call back at [phone]

## 2025-03-11 ENCOUNTER — TELEPHONE (OUTPATIENT)
Dept: UROLOGY | Facility: CLINIC | Age: 75
End: 2025-03-11
Payer: MEDICARE

## 2025-03-12 ENCOUNTER — OFFICE VISIT (OUTPATIENT)
Dept: UROLOGY | Facility: CLINIC | Age: 75
End: 2025-03-12
Payer: MEDICARE

## 2025-03-12 VITALS — WEIGHT: 167 LBS | RESPIRATION RATE: 18 BRPM | BODY MASS INDEX: 35.05 KG/M2 | HEIGHT: 58 IN

## 2025-03-12 DIAGNOSIS — N28.9 ABNORMAL KIDNEY FUNCTION: Primary | ICD-10-CM

## 2025-03-12 PROCEDURE — 99213 OFFICE O/P EST LOW 20 MIN: CPT | Mod: S$GLB,,, | Performed by: FAMILY MEDICINE

## 2025-03-12 RX ORDER — FOLIC ACID 1 MG/1
1 TABLET ORAL EVERY MORNING
COMMUNITY

## 2025-03-14 NOTE — PROGRESS NOTES
Subjective:       Patient ID: Farzaneh Velez is a 74 y.o. female.    Chief Complaint: No chief complaint on file.      HPI: 74-year-old female patient presenting to the clinic to follow up for kidney function.  Patient was referred to Nephrology however she followed up with our clinic.  Patient had recent lab work that showed some abnormal kidney function.         Past Medical History:   Past Medical History:   Diagnosis Date    A-fib     Anemia     CAD (coronary artery disease)     Constipation     Cystitis     Dysuria     GERD (gastroesophageal reflux disease)     Hypertension     Hypothyroidism, unspecified     IBS (irritable bowel syndrome)     Insomnia     Interstitial cystitis     Mixed hyperlipidemia     Neuropathy     Osteopenia     Thalassemia     Urinary tract infection        Past Surgical Historical:   Past Surgical History:   Procedure Laterality Date    BACK SURGERY      cataract repair Bilateral      SECTION      COLONOSCOPY  2017    CYSTECTOMY  2023    Cyst removed from throat    removal of thyroid          Medications:   Medication List with Changes/Refills   Current Medications    AMIODARONE (PACERONE) 200 MG TAB    Take 200 mg by mouth once daily.    AMLODIPINE (NORVASC) 10 MG TABLET    Take 1 tablet (10 mg total) by mouth once daily.    CONJUGATED ESTROGENS (PREMARIN) VAGINAL CREAM    Place 0.5 g vaginally once daily.    CYCLOSPORINE (RESTASIS) 0.05 % OPHTHALMIC EMULSION    Place 1 drop into both eyes 2 (two) times daily.    DICLOFENAC SODIUM (VOLTAREN) 1 % GEL    Apply topically once daily.    ELIQUIS 5 MG TAB    Take 5 mg by mouth 2 (two) times daily.    FAMOTIDINE (PEPCID) 20 MG TABLET    Take 20 mg by mouth 2 (two) times daily.    FLUTICASONE PROPIONATE (FLONASE) 50 MCG/ACTUATION NASAL SPRAY    2 sprays by Each Nostril route as needed.    FOLIC ACID (FOLVITE) 1 MG TABLET    Take 1 tablet by mouth every morning.    FUROSEMIDE (LASIX) 20 MG TABLET        GABAPENTIN  "(NEURONTIN) 300 MG CAPSULE    TAKE 1 CAPSULE BY MOUTH THREE TIMES DAILY    HYDRALAZINE (APRESOLINE) 25 MG TABLET    Take 25 mg by mouth 2 (two) times daily.    LEVOTHYROXINE (SYNTHROID) 88 MCG TABLET    Take 1 tablet (88 mcg total) by mouth before breakfast.    PROMETHAZINE-DEXTROMETHORPHAN (PROMETHAZINE-DM) 6.25-15 MG/5 ML SYRP    Take 5 mLs by mouth every 4 (four) hours as needed.    ROSUVASTATIN (CRESTOR) 5 MG TABLET    Take 5 mg by mouth every evening.    TEMAZEPAM (RESTORIL) 15 MG CAP    TAKE 1 CAPSULE BY MOUTH NIGHTLY AS NEEDED        Past Social History: Social History[1]    Allergies:   Review of patient's allergies indicates:   Allergen Reactions    Tizanidine Hallucinations     Patient saw shadows & figures on the wall    Cat dander      Other reaction(s): ITCHY EYES NOSE    Latex, natural rubber      "Peeled my skin off"    Levaquin [levofloxacin] Hallucinations    Sulfamethoxazole-trimethoprim Hives        Family History:   Family History   Problem Relation Name Age of Onset    Hypertension Father      Heart attack Father      Hypertension Mother      Heart attack Mother      Thalassemia Mother      Breast cancer Other Maternal cousin         onset unknown        Review of Systems:  Review of Systems   Constitutional:  Negative for activity change, appetite change, chills, diaphoresis, fatigue, fever and unexpected weight change.   HENT:  Negative for congestion, dental problem, drooling, ear discharge, ear pain, facial swelling, hearing loss, mouth sores, nosebleeds, postnasal drip, rhinorrhea, sinus pressure, sinus pain, sneezing, sore throat, tinnitus, trouble swallowing and voice change.    Eyes:  Negative for photophobia, pain, discharge, redness, itching and visual disturbance.   Respiratory:  Negative for apnea, cough, choking, chest tightness, shortness of breath, wheezing and stridor.    Cardiovascular:  Negative for chest pain and leg swelling.   Gastrointestinal:  Negative for abdominal " distention, abdominal pain, anal bleeding, blood in stool, constipation, diarrhea, nausea, rectal pain and vomiting.   Endocrine: Negative for cold intolerance, heat intolerance, polydipsia, polyphagia and polyuria.   Genitourinary: Negative.  Negative for decreased urine volume, difficulty urinating, dyspareunia, dysuria, enuresis, flank pain, frequency, genital sores, hematuria, menstrual problem, pelvic pain, urgency, vaginal bleeding, vaginal discharge and vaginal pain.   Musculoskeletal:  Negative for arthralgias, back pain, gait problem, joint swelling, myalgias, neck pain and neck stiffness.   Skin:  Negative for color change, pallor, rash and wound.   Allergic/Immunologic: Negative for environmental allergies, food allergies and immunocompromised state.   Neurological:  Negative for dizziness, tremors, seizures, syncope, facial asymmetry, speech difficulty, weakness, light-headedness, numbness and headaches.   Hematological:  Negative for adenopathy. Does not bruise/bleed easily.   Psychiatric/Behavioral:  Negative for agitation, behavioral problems, confusion, decreased concentration, dysphoric mood, hallucinations, self-injury, sleep disturbance and suicidal ideas. The patient is not nervous/anxious and is not hyperactive.        Physical Exam:  Physical Exam  Exam conducted with a chaperone present.   Constitutional:       Appearance: Normal appearance.   HENT:      Head: Normocephalic.      Nose: Nose normal.      Mouth/Throat:      Mouth: Mucous membranes are moist.      Pharynx: Oropharynx is clear.   Eyes:      Extraocular Movements: Extraocular movements intact.      Conjunctiva/sclera: Conjunctivae normal.      Pupils: Pupils are equal, round, and reactive to light.   Cardiovascular:      Rate and Rhythm: Normal rate and regular rhythm.      Pulses: Normal pulses.      Heart sounds: Normal heart sounds.   Pulmonary:      Effort: Pulmonary effort is normal.      Breath sounds: Normal breath sounds.    Abdominal:      General: Abdomen is flat. Bowel sounds are normal.      Palpations: Abdomen is soft.      Hernia: There is no hernia in the left inguinal area or right inguinal area.   Genitourinary:     General: Normal vulva.      Pubic Area: No rash or pubic lice.       Labia:         Right: No rash, tenderness, lesion or injury.         Left: No rash, tenderness, lesion or injury.       Urethra: No prolapse, urethral pain, urethral swelling or urethral lesion.      Rectum: Normal.   Musculoskeletal:         General: Normal range of motion.      Cervical back: Normal range of motion and neck supple.   Lymphadenopathy:      Lower Body: No right inguinal adenopathy. No left inguinal adenopathy.   Skin:     General: Skin is warm and dry.      Capillary Refill: Capillary refill takes less than 2 seconds.   Neurological:      General: No focal deficit present.      Mental Status: She is alert and oriented to person, place, and time. Mental status is at baseline.   Psychiatric:         Mood and Affect: Mood normal.         Behavior: Behavior normal.         Thought Content: Thought content normal.         Judgment: Judgment normal.         Assessment/Plan:     Abnormal kidney function:  We will refer the patient to Nephrology.  No other complaints at this time  Problem List Items Addressed This Visit    None              [1]   Social History  Socioeconomic History    Marital status:    Tobacco Use    Smoking status: Never     Passive exposure: Never    Smokeless tobacco: Never   Substance and Sexual Activity    Alcohol use: Never    Drug use: Never    Sexual activity: Not Currently     Partners: Male     Birth control/protection: Post-menopausal     Social Drivers of Health     Financial Resource Strain: Low Risk  (5/11/2023)    Overall Financial Resource Strain (CARDIA)     Difficulty of Paying Living Expenses: Not hard at all   Food Insecurity: No Food Insecurity (5/11/2023)    Hunger Vital Sign     Worried  About Running Out of Food in the Last Year: Never true     Ran Out of Food in the Last Year: Never true   Physical Activity: Inactive (5/11/2023)    Exercise Vital Sign     Days of Exercise per Week: 0 days     Minutes of Exercise per Session: 0 min   Stress: Stress Concern Present (5/11/2023)    [employer] - Occupational Stress Questionnaire     Feeling of Stress : To some extent   Housing Stability: Unknown (5/11/2023)    Housing Stability Vital Sign     Unable to Pay for Housing in the Last Year: No     Unstable Housing in the Last Year: No

## 2025-03-24 DIAGNOSIS — N28.9 ABNORMAL KIDNEY FUNCTION: Primary | ICD-10-CM

## 2025-04-03 ENCOUNTER — OFFICE VISIT (OUTPATIENT)
Dept: FAMILY MEDICINE | Facility: CLINIC | Age: 75
End: 2025-04-03
Payer: MEDICARE

## 2025-04-03 VITALS
DIASTOLIC BLOOD PRESSURE: 65 MMHG | BODY MASS INDEX: 35.05 KG/M2 | TEMPERATURE: 97 F | RESPIRATION RATE: 20 BRPM | SYSTOLIC BLOOD PRESSURE: 134 MMHG | WEIGHT: 167 LBS | HEART RATE: 67 BPM | HEIGHT: 58 IN

## 2025-04-03 DIAGNOSIS — G47.00 INSOMNIA, UNSPECIFIED TYPE: ICD-10-CM

## 2025-04-03 DIAGNOSIS — E66.01 SEVERE OBESITY (BMI 35.0-39.9) WITH COMORBIDITY: ICD-10-CM

## 2025-04-03 DIAGNOSIS — H60.311 ACUTE DIFFUSE OTITIS EXTERNA OF RIGHT EAR: ICD-10-CM

## 2025-04-03 PROCEDURE — 1126F AMNT PAIN NOTED NONE PRSNT: CPT | Mod: ,,, | Performed by: NURSE PRACTITIONER

## 2025-04-03 PROCEDURE — 1159F MED LIST DOCD IN RCRD: CPT | Mod: ,,, | Performed by: NURSE PRACTITIONER

## 2025-04-03 PROCEDURE — 1160F RVW MEDS BY RX/DR IN RCRD: CPT | Mod: ,,, | Performed by: NURSE PRACTITIONER

## 2025-04-03 PROCEDURE — 3078F DIAST BP <80 MM HG: CPT | Mod: ,,, | Performed by: NURSE PRACTITIONER

## 2025-04-03 PROCEDURE — 3008F BODY MASS INDEX DOCD: CPT | Mod: ,,, | Performed by: NURSE PRACTITIONER

## 2025-04-03 PROCEDURE — 99214 OFFICE O/P EST MOD 30 MIN: CPT | Mod: ,,, | Performed by: NURSE PRACTITIONER

## 2025-04-03 PROCEDURE — 3075F SYST BP GE 130 - 139MM HG: CPT | Mod: ,,, | Performed by: NURSE PRACTITIONER

## 2025-04-03 RX ORDER — TEMAZEPAM 15 MG/1
15 CAPSULE ORAL NIGHTLY PRN
Qty: 30 CAPSULE | Refills: 4 | Status: SHIPPED | OUTPATIENT
Start: 2025-04-03

## 2025-04-03 RX ORDER — NEOMYCIN SULFATE, POLYMYXIN B SULFATE AND HYDROCORTISONE 10; 3.5; 1 MG/ML; MG/ML; [USP'U]/ML
3 SUSPENSION/ DROPS AURICULAR (OTIC) 3 TIMES DAILY
Qty: 10 ML | Refills: 0 | Status: SHIPPED | OUTPATIENT
Start: 2025-04-03 | End: 2025-04-10

## 2025-04-03 NOTE — PROGRESS NOTES
"Subjective:       Patient ID: Farzaneh Velez is a 74 y.o. female.    Chief Complaint: Otalgia (Right earache X's 2 days) and Insomnia (Refill medication)      The patient is a 74-year-old female who presents complaining of right ear itching and pain in the right ear.  Relief measures at home have been none.    She is using Restoril 15 mg for sleep at night and she requests a refill of the Restoril.    Otalgia   There is pain in the right ear. This is a new problem. The current episode started in the past 7 days. The problem occurs constantly. The problem has been gradually worsening. There has been no fever. The pain is at a severity of 6/10. The pain is moderate. Associated symptoms comments: Ear itching. She has tried nothing for the symptoms.     Review of Systems   HENT:  Positive for ear pain.    12 point review of systems conducted, negative except as stated in the history of present illness. See HPI for details.      Objective:      Visit Vitals  /65   Pulse 67   Temp 97.2 °F (36.2 °C)   Resp 20   Ht 4' 10" (1.473 m)   Wt 75.8 kg (167 lb)   BMI 34.90 kg/m²     Physical Exam  Vitals and nursing note reviewed.   Constitutional:       Appearance: She is obese.   HENT:      Head: Normocephalic.      Right Ear: Tympanic membrane normal.      Left Ear: Tympanic membrane, ear canal and external ear normal.      Ears:      Comments: Right ear canal erythemic and swollen.  However the TM is clear     Nose: Nose normal.      Mouth/Throat:      Mouth: Mucous membranes are moist.   Eyes:      Extraocular Movements: Extraocular movements intact.   Cardiovascular:      Rate and Rhythm: Normal rate and regular rhythm.      Heart sounds: No murmur heard.  Pulmonary:      Effort: Pulmonary effort is normal.      Breath sounds: Normal breath sounds.   Abdominal:      General: Bowel sounds are normal.      Palpations: Abdomen is soft.   Musculoskeletal:         General: Normal range of motion.      Cervical back: Normal " range of motion and neck supple.   Skin:     General: Skin is warm and dry.   Neurological:      Mental Status: She is alert and oriented to person, place, and time.       Current Outpatient Medications   Medication Instructions    amiodarone (PACERONE) 200 mg, Daily    amLODIPine (NORVASC) 10 mg, Oral, Daily    conjugated estrogens (PREMARIN) 0.5 g, Vaginal, Daily    cycloSPORINE (RESTASIS) 0.05 % ophthalmic emulsion 1 drop, Both Eyes, 2 times daily    diclofenac sodium (VOLTAREN) 1 % Gel Topical (Top), Daily    ELIQUIS 5 mg, 2 times daily    famotidine (PEPCID) 20 mg, 2 times daily    fluticasone propionate (FLONASE) 50 mcg/actuation nasal spray 2 sprays, As needed (PRN)    folic acid (FOLVITE) 1 MG tablet 1 tablet, Every morning    furosemide (LASIX) 20 MG tablet No dose, route, or frequency recorded.    gabapentin (NEURONTIN) 300 mg, Oral, 3 times daily    hydrALAZINE (APRESOLINE) 25 mg, 2 times daily    levothyroxine (SYNTHROID) 88 mcg, Oral, Before breakfast    neomycin-polymyxin-hydrocortisone (CORTISPORIN) 3.5-10,000-1 mg/mL-unit/mL-% otic suspension 3 drops, Right Ear, 3 times daily    promethazine-dextromethorphan (PROMETHAZINE-DM) 6.25-15 mg/5 mL Syrp 5 mLs, Oral, Every 4 hours PRN    rosuvastatin (CRESTOR) 5 mg, Nightly    temazepam (RESTORIL) 15 mg, Oral, Nightly PRN     is allergic to tizanidine; cat dander; latex, natural rubber; levaquin [levofloxacin]; and sulfamethoxazole-trimethoprim.       Assessment:         ICD-10-CM ICD-9-CM   1. Insomnia, unspecified type  G47.00 780.52   2. Acute diffuse otitis externa of right ear  H60.311 380.10   3. Severe obesity (BMI 35.0-39.9) with comorbidity  E66.01 278.01          Plan:       1. Insomnia, unspecified type  - temazepam (RESTORIL) 15 mg Cap; Take 1 capsule (15 mg total) by mouth nightly as needed (insomnia).  Dispense: 30 capsule; Refill: 4    2. Acute diffuse otitis externa of right ear  - neomycin-polymyxin-hydrocortisone (CORTISPORIN) 3.5-10,000-1  mg/mL-unit/mL-% otic suspension; Place 3 drops into the right ear 3 (three) times daily. for 7 days  Dispense: 10 mL; Refill: 0    3. Severe obesity (BMI 35.0-39.9) with comorbidity  Obesity  Reduced calorie diet modification  Frequent self weighing discussed  Exercise/lifestyle modification        Follow up in about 3 months (around 7/3/2025).     Future Appointments   Date Time Provider Department Center   4/23/2025  1:00 PM Griffin Rodriguez MD [facility]

## 2025-04-16 NOTE — PROGRESS NOTES
"  Chief Complaint: Annual exam    Chief Complaint   Patient presents with    Well Woman     Pt is here for annual gyn exam       HPI:   Farzaneh Velez is a 75 y.o. year old  here for her Annual Exam. Doing well today, denies complaints.     Cancer-related family history includes Breast cancer in an other family member. There is no history of Ovarian cancer, Cervical cancer, or Uterine cancer.      Gyn History:    Menstrual History  Cycle: No  Menarche Age: 15 years    Menopause  Menopause Age: 58 years  Post Menopausal Bleeding: No  Hormone Replacement Therapy: No    Pap History  Last pap date: 23  Result: Normal  History of Abnormal Pap: No  HPV Vaccine Completed: No    [place]  Sexually Active: Yes  Sexual Orientation: heterosexual  Postcoital Bleeding: No  Dyspareunia: No  STI History: No  Contraception: No    Breast History  Last Breast Imaging Date: Yes  Date: 23  History of Abnormal Breast Imaging : No  History of Breast Biopsy: No            Past Medical History:   Diagnosis Date    A-fib     Anemia     CAD (coronary artery disease)     Constipation     Cystitis     Dysuria     GERD (gastroesophageal reflux disease)     Hypertension     Hypothyroidism, unspecified     IBS (irritable bowel syndrome)     Insomnia     Interstitial cystitis     Mixed hyperlipidemia     Neuropathy     Osteopenia     Thalassemia     Urinary tract infection      Past Surgical History:   Procedure Laterality Date    BACK SURGERY      cataract repair Bilateral      SECTION      COLONOSCOPY  2017    CYSTECTOMY  2023    Cyst removed from throat    removal of thyroid       Current Medications[1]  Review of patient's allergies indicates:   Allergen Reactions    Tizanidine Hallucinations     Patient saw shadows & figures on the wall    Cat dander      Other reaction(s): ITCHY EYES NOSE    Latex, natural rubber      "Peeled my skin off"    Levaquin [levofloxacin] Hallucinations    " Sulfamethoxazole-trimethoprim Hives     OB History    Para Term  AB Living   3 3 3  0 3   SAB IAB Ectopic Multiple Live Births   0    3      # Outcome Date GA Lbr Tushar/2nd Weight Sex Type Anes PTL Lv   3 Term 76   3.43 kg (7 lb 9 oz) M CS-LTranv   NIKITA   2 Term 73   3.232 kg (7 lb 2 oz) F CS-LTranv   NIKITA      Complications: Breech presentation   1 Term 71   2.892 kg (6 lb 6 oz) M Vag-Spont   NIKITA     Social History[2]  Family History   Problem Relation Name Age of Onset    Hypertension Father      Heart attack Father      Hypertension Mother      Heart attack Mother      Thalassemia Mother      Breast cancer Other Maternal cousin         onset unknown    Colon cancer Neg Hx      Ovarian cancer Neg Hx      Cervical cancer Neg Hx      Uterine cancer Neg Hx         Review of Systems:  Review of Systems   Constitutional:  Negative for appetite change, chills, fatigue, fever and unexpected weight change.   Eyes:  Negative for visual disturbance.   Respiratory:  Negative for cough, shortness of breath and wheezing.    Cardiovascular:  Negative for chest pain, palpitations and leg swelling.   Gastrointestinal:  Negative for abdominal pain, bloating, blood in stool, constipation, diarrhea, nausea, vomiting, reflux and fecal incontinence.   Endocrine: Negative for hair loss and hot flashes.   Genitourinary:  Negative for bladder incontinence, decreased libido, dysmenorrhea, dyspareunia, dysuria, flank pain, frequency, genital sores, hematuria, hot flashes, menorrhagia, menstrual problem, pelvic pain, urgency, vaginal bleeding, vaginal discharge, vaginal pain, urinary incontinence, postcoital bleeding, postmenopausal bleeding, vaginal dryness and vaginal odor.   Integumentary:  Negative for rash, acne, hair changes, breast mass, nipple discharge, breast skin changes and breast tenderness.   Neurological:  Negative for headaches.   Psychiatric/Behavioral:  Negative for depression.    Breast:  Negative for asymmetry, breast self exam, lump, mass, mastodynia, nipple discharge, skin changes and tenderness       Physical Exam:   Vitals:    04/23/25 1316   BP: (!) 146/64     Body mass index is 34.94 kg/m².    Physical Exam:   Constitutional: She is oriented to person, place, and time. She appears well-developed and well-nourished.    HENT:   Head: Normocephalic.      Cardiovascular:       Exam reveals no edema.        Pulmonary/Chest: Effort normal. She exhibits no mass, no tenderness, no bony tenderness, no deformity and no retraction. Right breast exhibits no inverted nipple, no mass, no nipple discharge, no skin change, no tenderness, no bleeding, no swelling, no mastectomy, no augmentation and no lumpectomy. Left breast exhibits no inverted nipple, no mass, no nipple discharge, no skin change, no tenderness, no bleeding, no swelling, no mastectomy, no augmentation and no lumpectomy. Breasts are symmetrical.        Abdominal: Soft. She exhibits no distension and no mass. There is no abdominal tenderness. There is no rebound and no guarding. No hernia. Hernia confirmed negative in the right inguinal area.     Genitourinary:    Inguinal canal, vagina, uterus, right adnexa, left adnexa and rectum normal.   Rectum:      No anal fissure or external hemorrhoid.   The external female genitalia was normal.   No external genitalia lesions identified,Genitalia hair distrobution normal .     Labial bartholins normal.There is no rash, tenderness, lesion or injury on the right labia. There is no rash, tenderness, lesion or injury on the left labia. Cervix is normal. No no masses or organomegaly. Right adnexum displays no mass, no tenderness and no fullness. Left adnexum displays no mass, no tenderness and no fullness. Vagina exhibits no lesion. No erythema, vaginal discharge, tenderness, bleeding, rectocele, cystocele or prolapse of vaginal walls in the vagina.    No foreign body in the vagina.      No signs of injury  in the vagina.   Vagina was moist.Cervix exhibits no motion tenderness, no lesion, no discharge, no friability, no tenderness and no polyp. Uterus consistancy normal and Uerus contour normal  Uterus is not deviated, not enlarged, not fixed, not tender, not hosting fibroids and no uterine prolapse. Normal urethral meatus.Urethral Meatus exhibits: no urethral lesionUrethra findings: no urethral mass, no tenderness and no prolapsedBladder findings: no bladder tenderness          Musculoskeletal: Normal range of motion.      Lymphadenopathy: No inguinal adenopathy noted on the right or left side.    Neurological: She is alert and oriented to person, place, and time.    Skin: Skin is warm and dry.    Psychiatric: She has a normal mood and affect. Her behavior is normal. Judgment and thought content normal.        Assessment:   Annual Well Women Exam  1. Encounter for well woman exam with abnormal findings    2. Hypertension, unspecified type    3. BMI 34.0-34.9,adult    Plan:    Breast self-awareness  Annual mammograms  Rec colonoscopy per guidelines  Rec BMD per guidelines  Rec exercise 3 times weekly  Keep yearly FU with PCP  PCP of elevated blood pressure  HTN precautions       RTC prn/annual     This note was transcribed by Pamela Brunson. There may be transcription errors as a result, however minimal. Effort has been made to ensure accuracy of transcription, but any obvious errors or omissions should be clarified with the author of the document.              [1]   Current Outpatient Medications:     amLODIPine (NORVASC) 10 MG tablet, Take 1 tablet (10 mg total) by mouth once daily., Disp: 90 tablet, Rfl: 0    diclofenac sodium (VOLTAREN) 1 % Gel, Apply topically once daily., Disp: 1 each, Rfl: 3    ELIQUIS 5 mg Tab, Take 5 mg by mouth 2 (two) times daily., Disp: , Rfl:     famotidine (PEPCID) 20 MG tablet, Take 20 mg by mouth 2 (two) times daily., Disp: , Rfl:     fluticasone propionate (FLONASE) 50 mcg/actuation  nasal spray, 2 sprays by Each Nostril route as needed., Disp: , Rfl:     folic acid (FOLVITE) 1 MG tablet, Take 1 tablet by mouth every morning., Disp: , Rfl:     furosemide (LASIX) 20 MG tablet, , Disp: , Rfl:     gabapentin (NEURONTIN) 300 MG capsule, TAKE 1 CAPSULE BY MOUTH THREE TIMES DAILY, Disp: 90 capsule, Rfl: 0    hydrALAZINE (APRESOLINE) 25 MG tablet, Take 25 mg by mouth 2 (two) times daily., Disp: , Rfl:     promethazine-dextromethorphan (PROMETHAZINE-DM) 6.25-15 mg/5 mL Syrp, Take 5 mLs by mouth every 4 (four) hours as needed., Disp: 118 mL, Rfl: 0    rosuvastatin (CRESTOR) 5 MG tablet, Take 5 mg by mouth every evening., Disp: , Rfl:     temazepam (RESTORIL) 15 mg Cap, Take 1 capsule (15 mg total) by mouth nightly as needed (insomnia)., Disp: 30 capsule, Rfl: 4    amiodarone (PACERONE) 200 MG Tab, Take 200 mg by mouth once daily. (Patient not taking: Reported on 3/12/2025), Disp: , Rfl:     conjugated estrogens (PREMARIN) vaginal cream, Place 0.5 g vaginally once daily. (Patient not taking: Reported on 4/23/2025), Disp: 1 applicator, Rfl: 5    cycloSPORINE (RESTASIS) 0.05 % ophthalmic emulsion, Place 1 drop into both eyes 2 (two) times daily. (Patient not taking: Reported on 3/12/2025), Disp: 1 each, Rfl: 11    levothyroxine (SYNTHROID) 88 MCG tablet, Take 1 tablet (88 mcg total) by mouth before breakfast. (Patient not taking: Reported on 4/23/2025), Disp: 30 tablet, Rfl: 3  [2]   Social History  Tobacco Use    Smoking status: Never     Passive exposure: Never    Smokeless tobacco: Never   Substance Use Topics    Alcohol use: Never    Drug use: Yes     Types: Marijuana     Comment: prescribe medical marijuana

## 2025-04-23 ENCOUNTER — OFFICE VISIT (OUTPATIENT)
Dept: OBSTETRICS AND GYNECOLOGY | Facility: CLINIC | Age: 75
End: 2025-04-23
Payer: MEDICARE

## 2025-04-23 VITALS
DIASTOLIC BLOOD PRESSURE: 64 MMHG | WEIGHT: 167.19 LBS | BODY MASS INDEX: 35.1 KG/M2 | SYSTOLIC BLOOD PRESSURE: 146 MMHG | HEIGHT: 58 IN

## 2025-04-23 DIAGNOSIS — Z01.411 ENCOUNTER FOR WELL WOMAN EXAM WITH ABNORMAL FINDINGS: Primary | ICD-10-CM

## 2025-04-23 DIAGNOSIS — I10 HYPERTENSION, UNSPECIFIED TYPE: ICD-10-CM

## 2025-04-23 PROCEDURE — 3077F SYST BP >= 140 MM HG: CPT | Mod: ,,, | Performed by: OBSTETRICS & GYNECOLOGY

## 2025-04-23 PROCEDURE — 3008F BODY MASS INDEX DOCD: CPT | Mod: ,,, | Performed by: OBSTETRICS & GYNECOLOGY

## 2025-04-23 PROCEDURE — 1159F MED LIST DOCD IN RCRD: CPT | Mod: ,,, | Performed by: OBSTETRICS & GYNECOLOGY

## 2025-04-23 PROCEDURE — 99397 PER PM REEVAL EST PAT 65+ YR: CPT | Mod: ,,, | Performed by: OBSTETRICS & GYNECOLOGY

## 2025-04-23 PROCEDURE — 3078F DIAST BP <80 MM HG: CPT | Mod: ,,, | Performed by: OBSTETRICS & GYNECOLOGY

## 2025-04-23 PROCEDURE — 1126F AMNT PAIN NOTED NONE PRSNT: CPT | Mod: ,,, | Performed by: OBSTETRICS & GYNECOLOGY

## 2025-06-02 ENCOUNTER — TELEPHONE (OUTPATIENT)
Dept: OBSTETRICS AND GYNECOLOGY | Facility: CLINIC | Age: 75
End: 2025-06-02
Payer: MEDICARE

## 2025-06-02 DIAGNOSIS — Z12.39 ENCOUNTER FOR SCREENING FOR MALIGNANT NEOPLASM OF BREAST, UNSPECIFIED SCREENING MODALITY: Primary | ICD-10-CM

## 2025-06-02 DIAGNOSIS — Z12.31 ENCOUNTER FOR SCREENING MAMMOGRAM FOR MALIGNANT NEOPLASM OF BREAST: ICD-10-CM

## 2025-06-10 ENCOUNTER — HOSPITAL ENCOUNTER (OUTPATIENT)
Dept: RADIOLOGY | Facility: HOSPITAL | Age: 75
Discharge: HOME | End: 2025-06-10
Attending: OBSTETRICS & GYNECOLOGY
Payer: MEDICARE

## 2025-06-10 DIAGNOSIS — Z12.31 ENCOUNTER FOR SCREENING MAMMOGRAM FOR MALIGNANT NEOPLASM OF BREAST: ICD-10-CM

## 2025-06-10 DIAGNOSIS — Z12.39 ENCOUNTER FOR SCREENING FOR MALIGNANT NEOPLASM OF BREAST, UNSPECIFIED SCREENING MODALITY: ICD-10-CM

## 2025-06-10 PROCEDURE — 77067 SCR MAMMO BI INCL CAD: CPT | Mod: 26,,, | Performed by: RADIOLOGY

## 2025-06-10 PROCEDURE — 77063 BREAST TOMOSYNTHESIS BI: CPT | Mod: 26,,, | Performed by: RADIOLOGY

## 2025-06-10 PROCEDURE — 77067 SCR MAMMO BI INCL CAD: CPT | Mod: TC

## 2025-06-25 ENCOUNTER — LAB VISIT (OUTPATIENT)
Dept: LAB | Facility: HOSPITAL | Age: 75
End: 2025-06-25
Attending: NURSE PRACTITIONER
Payer: MEDICARE

## 2025-06-25 DIAGNOSIS — E03.9 HYPOTHYROIDISM, UNSPECIFIED TYPE: ICD-10-CM

## 2025-06-25 DIAGNOSIS — M85.80 OSTEOPENIA, UNSPECIFIED LOCATION: ICD-10-CM

## 2025-06-25 DIAGNOSIS — I10 HYPERTENSION, UNSPECIFIED TYPE: ICD-10-CM

## 2025-06-25 DIAGNOSIS — H60.311 ACUTE DIFFUSE OTITIS EXTERNA OF RIGHT EAR: ICD-10-CM

## 2025-06-25 DIAGNOSIS — G62.9 NEUROPATHY: ICD-10-CM

## 2025-06-25 DIAGNOSIS — I48.91 ATRIAL FIBRILLATION, UNSPECIFIED TYPE: ICD-10-CM

## 2025-06-25 DIAGNOSIS — E78.5 HYPERLIPIDEMIA, UNSPECIFIED HYPERLIPIDEMIA TYPE: ICD-10-CM

## 2025-06-25 DIAGNOSIS — E66.01 SEVERE OBESITY (BMI 35.0-39.9) WITH COMORBIDITY: ICD-10-CM

## 2025-06-25 DIAGNOSIS — K21.9 GASTROESOPHAGEAL REFLUX DISEASE WITHOUT ESOPHAGITIS: ICD-10-CM

## 2025-06-25 PROBLEM — R30.0 DYSURIA: Status: RESOLVED | Noted: 2022-12-12 | Resolved: 2025-06-25

## 2025-06-25 LAB
25(OH)D3+25(OH)D2 SERPL-MCNC: 34 NG/ML (ref 30–80)
ALBUMIN SERPL-MCNC: 3.4 G/DL (ref 3.4–4.8)
ALBUMIN/GLOB SERPL: 1 RATIO (ref 1.1–2)
ALP SERPL-CCNC: 88 UNIT/L (ref 40–150)
ALT SERPL-CCNC: 9 UNIT/L (ref 0–55)
ANION GAP SERPL CALC-SCNC: 10 MEQ/L
ANISOCYTOSIS BLD QL SMEAR: ABNORMAL
AST SERPL-CCNC: 12 UNIT/L (ref 11–45)
BACTERIA #/AREA URNS AUTO: ABNORMAL /HPF
BASOPHILS # BLD AUTO: 0.1 X10(3)/MCL
BASOPHILS NFR BLD AUTO: 1.2 %
BILIRUB SERPL-MCNC: 0.6 MG/DL
BILIRUB UR QL STRIP.AUTO: NEGATIVE
BUN SERPL-MCNC: 19 MG/DL (ref 9.8–20.1)
CALCIUM SERPL-MCNC: 8.3 MG/DL (ref 8.4–10.2)
CHLORIDE SERPL-SCNC: 105 MMOL/L (ref 98–107)
CHOLEST SERPL-MCNC: 150 MG/DL
CHOLEST/HDLC SERPL: 3 {RATIO} (ref 0–5)
CLARITY UR: CLEAR
CO2 SERPL-SCNC: 30 MMOL/L (ref 23–31)
COLOR UR AUTO: YELLOW
CREAT SERPL-MCNC: 0.89 MG/DL (ref 0.55–1.02)
CREAT/UREA NIT SERPL: 21
EOSINOPHIL # BLD AUTO: 0.13 X10(3)/MCL (ref 0–0.9)
EOSINOPHIL NFR BLD AUTO: 1.6 %
ERYTHROCYTE [DISTWIDTH] IN BLOOD BY AUTOMATED COUNT: 16.2 % (ref 11.5–17)
GFR SERPLBLD CREATININE-BSD FMLA CKD-EPI: >60 ML/MIN/1.73/M2
GLOBULIN SER-MCNC: 3.4 GM/DL (ref 2.4–3.5)
GLUCOSE SERPL-MCNC: 92 MG/DL (ref 82–115)
GLUCOSE UR QL STRIP: NEGATIVE
HCT VFR BLD AUTO: 36.1 % (ref 37–47)
HDLC SERPL-MCNC: 43 MG/DL (ref 35–60)
HGB BLD-MCNC: 10.7 G/DL (ref 12–16)
HGB UR QL STRIP: NEGATIVE
HYPOCHROMIA BLD QL SMEAR: ABNORMAL
IMM GRANULOCYTES # BLD AUTO: 0.03 X10(3)/MCL (ref 0–0.04)
IMM GRANULOCYTES NFR BLD AUTO: 0.4 %
KETONES UR QL STRIP: NEGATIVE
LDLC SERPL CALC-MCNC: 60 MG/DL (ref 50–140)
LEUKOCYTE ESTERASE UR QL STRIP: ABNORMAL
LYMPHOCYTES # BLD AUTO: 1.39 X10(3)/MCL (ref 0.6–4.6)
LYMPHOCYTES NFR BLD AUTO: 16.8 %
MCH RBC QN AUTO: 18.4 PG (ref 27–31)
MCHC RBC AUTO-ENTMCNC: 29.6 G/DL (ref 33–36)
MCV RBC AUTO: 61.9 FL (ref 80–94)
MICROCYTES BLD QL SMEAR: ABNORMAL
MONOCYTES # BLD AUTO: 0.58 X10(3)/MCL (ref 0.1–1.3)
MONOCYTES NFR BLD AUTO: 7 %
NEUTROPHILS # BLD AUTO: 6.03 X10(3)/MCL (ref 2.1–9.2)
NEUTROPHILS NFR BLD AUTO: 73 %
NITRITE UR QL STRIP: NEGATIVE
NRBC BLD AUTO-RTO: 0 %
PH UR STRIP: 7 [PH]
PLATELET # BLD AUTO: 334 X10(3)/MCL (ref 130–400)
PLATELET # BLD EST: ADEQUATE 10*3/UL
PMV BLD AUTO: 9.6 FL (ref 7.4–10.4)
POLYCHROMASIA BLD QL SMEAR: SLIGHT
POTASSIUM SERPL-SCNC: 4.4 MMOL/L (ref 3.5–5.1)
PROT SERPL-MCNC: 6.8 GM/DL (ref 5.8–7.6)
PROT UR QL STRIP: NEGATIVE
RBC # BLD AUTO: 5.83 X10(6)/MCL (ref 4.2–5.4)
RBC #/AREA URNS AUTO: ABNORMAL /HPF
SODIUM SERPL-SCNC: 145 MMOL/L (ref 136–145)
SP GR UR STRIP.AUTO: 1.01 (ref 1–1.03)
SQUAMOUS #/AREA URNS AUTO: ABNORMAL /HPF
TARGETS BLD QL SMEAR: SLIGHT
TEAR DROP CELL (OLG): SLIGHT
TRIGL SERPL-MCNC: 234 MG/DL (ref 37–140)
TSH SERPL-ACNC: 1.57 UIU/ML (ref 0.35–4.94)
UROBILINOGEN UR STRIP-ACNC: 0.2
VLDLC SERPL CALC-MCNC: 47 MG/DL
WBC # BLD AUTO: 8.26 X10(3)/MCL (ref 4.5–11.5)
WBC #/AREA URNS AUTO: ABNORMAL /HPF
YEAST UR QL AUTO: ABNORMAL /HPF

## 2025-06-25 PROCEDURE — 82306 VITAMIN D 25 HYDROXY: CPT

## 2025-06-25 PROCEDURE — 81001 URINALYSIS AUTO W/SCOPE: CPT

## 2025-06-25 PROCEDURE — 80061 LIPID PANEL: CPT

## 2025-06-25 PROCEDURE — 80053 COMPREHEN METABOLIC PANEL: CPT

## 2025-06-25 PROCEDURE — 85025 COMPLETE CBC W/AUTO DIFF WBC: CPT

## 2025-06-25 PROCEDURE — 84443 ASSAY THYROID STIM HORMONE: CPT

## 2025-06-25 PROCEDURE — 36415 COLL VENOUS BLD VENIPUNCTURE: CPT

## 2025-07-02 ENCOUNTER — OFFICE VISIT (OUTPATIENT)
Dept: FAMILY MEDICINE | Facility: CLINIC | Age: 75
End: 2025-07-02
Payer: MEDICARE

## 2025-07-02 VITALS
SYSTOLIC BLOOD PRESSURE: 131 MMHG | DIASTOLIC BLOOD PRESSURE: 70 MMHG | TEMPERATURE: 98 F | HEART RATE: 55 BPM | BODY MASS INDEX: 34.85 KG/M2 | HEIGHT: 58 IN | WEIGHT: 166 LBS | RESPIRATION RATE: 20 BRPM

## 2025-07-02 DIAGNOSIS — R82.90 ABNORMAL URINE: ICD-10-CM

## 2025-07-02 DIAGNOSIS — K21.9 GASTROESOPHAGEAL REFLUX DISEASE WITHOUT ESOPHAGITIS: ICD-10-CM

## 2025-07-02 DIAGNOSIS — I10 HYPERTENSION, UNSPECIFIED TYPE: ICD-10-CM

## 2025-07-02 DIAGNOSIS — D50.9 MICROCYTIC ANEMIA: ICD-10-CM

## 2025-07-02 DIAGNOSIS — G62.9 NEUROPATHY: ICD-10-CM

## 2025-07-02 DIAGNOSIS — N18.31 CHRONIC KIDNEY DISEASE, STAGE 3A: ICD-10-CM

## 2025-07-02 DIAGNOSIS — I25.10 CORONARY ARTERY DISEASE INVOLVING NATIVE CORONARY ARTERY WITHOUT ANGINA PECTORIS, UNSPECIFIED WHETHER NATIVE OR TRANSPLANTED HEART: ICD-10-CM

## 2025-07-02 DIAGNOSIS — E03.9 HYPOTHYROIDISM, UNSPECIFIED TYPE: ICD-10-CM

## 2025-07-02 DIAGNOSIS — Z00.00 WELLNESS EXAMINATION: ICD-10-CM

## 2025-07-02 DIAGNOSIS — E07.9 THYROID EYE DISEASE: ICD-10-CM

## 2025-07-02 DIAGNOSIS — E66.811 CLASS 1 OBESITY WITH BODY MASS INDEX (BMI) OF 34.0 TO 34.9 IN ADULT, UNSPECIFIED OBESITY TYPE, UNSPECIFIED WHETHER SERIOUS COMORBIDITY PRESENT: ICD-10-CM

## 2025-07-02 DIAGNOSIS — G47.00 INSOMNIA, UNSPECIFIED TYPE: ICD-10-CM

## 2025-07-02 DIAGNOSIS — F41.9 ANXIETY: ICD-10-CM

## 2025-07-02 DIAGNOSIS — M85.89 OSTEOPENIA OF MULTIPLE SITES: ICD-10-CM

## 2025-07-02 DIAGNOSIS — H57.89 THYROID EYE DISEASE: ICD-10-CM

## 2025-07-02 DIAGNOSIS — E78.5 HYPERLIPIDEMIA, UNSPECIFIED HYPERLIPIDEMIA TYPE: ICD-10-CM

## 2025-07-02 DIAGNOSIS — I48.91 ATRIAL FIBRILLATION, UNSPECIFIED TYPE: ICD-10-CM

## 2025-07-02 PROBLEM — R05.9 COUGH: Status: RESOLVED | Noted: 2024-07-29 | Resolved: 2025-07-02

## 2025-07-02 PROBLEM — H60.311 ACUTE DIFFUSE OTITIS EXTERNA OF RIGHT EAR: Status: RESOLVED | Noted: 2025-04-03 | Resolved: 2025-07-02

## 2025-07-02 PROBLEM — N89.8 VAGINAL ITCHING: Status: RESOLVED | Noted: 2024-07-29 | Resolved: 2025-07-02

## 2025-07-02 PROBLEM — N30.00 ACUTE CYSTITIS WITHOUT HEMATURIA: Status: RESOLVED | Noted: 2022-12-12 | Resolved: 2025-07-02

## 2025-07-02 PROBLEM — N95.2 VAGINAL ATROPHY: Status: RESOLVED | Noted: 2024-04-18 | Resolved: 2025-07-02

## 2025-07-02 PROBLEM — B37.31 VAGINA, CANDIDIASIS: Status: RESOLVED | Noted: 2023-05-17 | Resolved: 2025-07-02

## 2025-07-02 PROBLEM — E66.01 SEVERE OBESITY (BMI 35.0-39.9) WITH COMORBIDITY: Status: RESOLVED | Noted: 2024-03-14 | Resolved: 2025-07-02

## 2025-07-02 PROBLEM — R39.89 URETHRAL PAIN: Status: RESOLVED | Noted: 2023-05-17 | Resolved: 2025-07-02

## 2025-07-02 LAB
BILIRUB SERPL-MCNC: NORMAL MG/DL
BLOOD URINE, POC: NORMAL
CLARITY, POC UA: CLEAR
COLOR, POC UA: YELLOW
GLUCOSE UR QL STRIP: NORMAL
KETONES UR QL STRIP: NORMAL
LEUKOCYTE ESTERASE URINE, POC: NORMAL
NITRITE, POC UA: NORMAL
PH, POC UA: 6
PROTEIN, POC: NORMAL
SPECIFIC GRAVITY, POC UA: 1.01
UROBILINOGEN, POC UA: 0.2

## 2025-07-02 RX ORDER — ALPRAZOLAM 0.25 MG/1
0.25 TABLET ORAL 3 TIMES DAILY
Qty: 90 TABLET | Refills: 0 | Status: SHIPPED | OUTPATIENT
Start: 2025-07-02 | End: 2025-07-02

## 2025-07-02 RX ORDER — ALPRAZOLAM 0.25 MG/1
0.25 TABLET ORAL DAILY
Qty: 30 TABLET | Refills: 0 | Status: SHIPPED | OUTPATIENT
Start: 2025-07-02 | End: 2025-08-01

## 2025-07-02 RX ORDER — LEVOTHYROXINE SODIUM 100 UG/1
100 TABLET ORAL
COMMUNITY

## 2025-07-02 RX ORDER — FUROSEMIDE 40 MG/1
40 TABLET ORAL DAILY
COMMUNITY
Start: 2025-06-18

## 2025-07-02 NOTE — PROGRESS NOTES
"Subjective:       Patient ID: Farzaneh Velez is a 75 y.o. female.    Chief Complaint: Annual Exam (Annual wellness visit) and Results (Lab results)      The patient is a 75-year-old female who presents for an annual wellness exam.  The patient's BMI is 34.69.  The patient has a following comorbid conditions:  GERD, microcytic anemia, anxiety, neuropathy, thyroid eye disease, hyperlipidemia, hypertension, atrial fib, coronary artery disease, CKD 3 a, hypothyroidism, insomnia and osteopenia.    We went over her lab work together.  Her CBC shows that she has microcytic anemia.  The patient tells me she has thalassemia.  Her urinalysis shows she may have a UTI.  We performed a urine dip and it was clear.    Review of [id number] point review of systems conducted, negative except as stated in the history of present illness. See HPI for details.      Objective:      Visit Vitals  /70   Pulse (!) 55   Temp 97.6 °F (36.4 °C)   Resp 20   Ht 4' 10" (1.473 m)   Wt 75.3 kg (166 lb)   BMI 34.69 kg/m²     Physical Exam  Vitals and nursing note reviewed.   Constitutional:       Appearance: She is obese.   HENT:      Head: Normocephalic.      Right Ear: Tympanic membrane normal.      Left Ear: Tympanic membrane normal.      Nose: Nose normal.      Mouth/Throat:      Mouth: Mucous membranes are moist.   Cardiovascular:      Rate and Rhythm: Normal rate and regular rhythm.      Heart sounds: No murmur heard.  Pulmonary:      Effort: Pulmonary effort is normal.      Breath sounds: Normal breath sounds.   Abdominal:      General: Bowel sounds are normal.      Palpations: Abdomen is soft.   Musculoskeletal:         General: Normal range of motion.      Cervical back: Normal range of motion and neck supple.   Skin:     General: Skin is warm and dry.   Neurological:      Mental Status: She is alert and oriented to person, place, and time.       Current Outpatient Medications   Medication Instructions    ALPRAZolam (XANAX) 0.25 mg, " Oral, Daily    amiodarone (PACERONE) 200 mg, Daily    amLODIPine (NORVASC) 10 mg, Oral, Daily    conjugated estrogens (PREMARIN) 0.5 g, Vaginal, Daily    cycloSPORINE (RESTASIS) 0.05 % ophthalmic emulsion 1 drop, Both Eyes, 2 times daily    diclofenac sodium (VOLTAREN) 1 % Gel Topical (Top), Daily    ELIQUIS 5 mg, 2 times daily    famotidine (PEPCID) 20 mg, 2 times daily    fluticasone propionate (FLONASE) 50 mcg/actuation nasal spray 2 sprays, As needed (PRN)    folic acid (FOLVITE) 1 MG tablet 1 tablet, Every morning    furosemide (LASIX) 40 mg, Daily    gabapentin (NEURONTIN) 300 mg, Oral, 3 times daily    hydrALAZINE (APRESOLINE) 25 mg, 2 times daily    levothyroxine (SYNTHROID) 100 mcg, Before breakfast    promethazine-dextromethorphan (PROMETHAZINE-DM) 6.25-15 mg/5 mL Syrp 5 mLs, Oral, Every 4 hours PRN    rosuvastatin (CRESTOR) 5 mg, Nightly    temazepam (RESTORIL) 15 mg, Oral, Nightly PRN     is allergic to tizanidine; cat dander; latex, natural rubber; levaquin [levofloxacin]; and sulfamethoxazole-trimethoprim.       Assessment:         ICD-10-CM ICD-9-CM   1. Wellness examination  Z00.00 V70.0   2. Microcytic anemia  D50.9 280.9   3. Anxiety  F41.9 300.00   4. Abnormal urine  R82.90 791.9   5. Chronic kidney disease, stage 3a  N18.31 585.3   6. Neuropathy  G62.9 355.9   7. Thyroid eye disease  H57.89 376.89    E07.9 246.9   8. Hyperlipidemia, unspecified hyperlipidemia type  E78.5 272.4   9. Hypertension, unspecified type  I10 401.9   10. Atrial fibrillation, unspecified type  I48.91 427.31   11. Coronary artery disease involving native coronary artery without angina pectoris, unspecified whether native or transplanted heart  I25.10 414.01   12. Hypothyroidism, unspecified type  E03.9 244.9   13. Gastroesophageal reflux disease without esophagitis  K21.9 530.81   14. Insomnia, unspecified type  G47.00 780.52   15. Osteopenia of multiple sites  M85.89 733.90   16. Class 1 obesity with body mass index (BMI)  of 34.0 to 34.9 in adult, unspecified obesity type, unspecified whether serious comorbidity present  E66.811 278.00    Z68.34 V85.34          Plan:       1. Wellness examination  Return to clinic in 1 year for wellness exam    2. Microcytic anemia  Stable  Patient sees Hematology for thalassemia    3. Anxiety  The patient takes Restoril at night for sleep.  Instructed to take Xanax only 1 time during the day at least 6-8 hours prior to taking Restoril  - ALPRAZolam (XANAX) 0.25 MG tablet; Take 1 tablet (0.25 mg total) by mouth Daily.  Dispense: 30 tablet; Refill: 0    4. Abnormal urine  - POCT URINE DIPSTICK WITHOUT MICROSCOPE  0 Result Notes      Component  Ref Range & Units (hover) 11:27   Glucose, UA -   Bilirubin, POC -   Ketones, UA -   Spec Grav UA 1.010   Blood, UA -   pH, UA 6.0   Protein, POC -   Urobilinogen, UA 0.2   Nitrite, UA -   WBC, UA -   Color, UA Yellow   Clarity, UA Clear             Specimen Collected: 07/02/25 11:27 CDT       5. Chronic kidney disease, stage 3a  Stable  Patient sees Nephrology    6. Neuropathy  Stable  Patient takes gabapentin    7. Thyroid eye disease  Stable  Patient sees Ophthalmology    8. Hyperlipidemia, unspecified hyperlipidemia type  Hyperlipidemia  - tolerating medication-rosuvastatin  - no myalgia  - watching diet    9. Hypertension, unspecified type  Hypertension  Controlled  Continue current medication- Apresoline and amlodipine  Return to clinic with any concerns    10. Atrial fibrillation, unspecified type  Stable  Patient sees Cardiology    11. Coronary artery disease involving native coronary artery without angina pectoris, unspecified whether native or transplanted heart  Stable  Patient sees Cardiology    12. Hypothyroidism, unspecified type  Hypothyroid  Controlled  Continue current medication-levothyroxine  Return to clinic with any concerns    13. Gastroesophageal reflux disease without esophagitis  Stable  Patient takes Pepcid    14. Insomnia, unspecified  type  Stable  Patient takes Restoril    15. Osteopenia of multiple sites  Patient can not take calcium due to constipation.  Instructed to take vitamin D3 1000  IU daily oral    16. Class 1 obesity with body mass index (BMI) of 34.0 to 34.9 in adult, unspecified obesity type, unspecified whether serious comorbidity present  Obesity  Reduced calorie diet modification  Frequent self weighing discussed  Exercise/lifestyle modification      Chemistry:  Lab Results   Component Value Date     06/25/2025    K 4.4 06/25/2025    BUN 19.0 06/25/2025    CREATININE 0.89 06/25/2025    EGFRNORACEVR >60 06/25/2025    CALCIUM 8.3 (L) 06/25/2025    ALKPHOS 88 06/25/2025    ALBUMIN 3.4 06/25/2025    BILIDIR 0.2 02/22/2022    IBILI 0.30 02/22/2022    AST 12 06/25/2025    ALT 9 06/25/2025    MG 1.9 09/13/2018    [id number] 34 06/25/2025    TSH 1.573 06/25/2025    [id number] 1.30 11/01/2024        Lab Results   Component Value Date    HGBA1C 5.0 09/28/2020        Hematology:  Lab Results   Component Value Date    WBC 8.26 06/25/2025    HGB 10.7 (L) 06/25/2025    HCT 36.1 (L) 06/25/2025     06/25/2025       Lipid Panel:  Lab Results   Component Value Date    CHOL 150 06/25/2025    HDL 43 06/25/2025    LDL 60.00 06/25/2025    TRIG 234 (H) 06/25/2025    TOTALCHOLEST 3 06/25/2025        Urine:  Lab Results   Component Value Date    APPEARANCEUA Clear 06/25/2025    SGUA 1.010 06/25/2025    PROTEINUA Negative 06/25/2025    KETONESUA Negative 06/25/2025    LEUKOCYTESUR Trace (A) 06/25/2025    RBCUA 0-2 06/25/2025    WBCUA 6-10 (A) 06/25/2025    BACTERIA Moderate (A) 06/25/2025        Follow up in about 6 months (around 1/2/2026).     Future Appointments   Date Time Provider Department Center   1/2/2026 10:00 AM Yessica Ma, NP [facility]

## 2025-07-28 DIAGNOSIS — E03.9 HYPOTHYROIDISM, UNSPECIFIED TYPE: ICD-10-CM

## 2025-07-28 DIAGNOSIS — H04.129 DRY EYE: ICD-10-CM

## 2025-07-28 RX ORDER — LEVOTHYROXINE SODIUM 100 UG/1
100 TABLET ORAL
Qty: 30 TABLET | Refills: 11 | Status: SHIPPED | OUTPATIENT
Start: 2025-07-28 | End: 2026-07-23

## 2025-07-28 RX ORDER — SODIUM CHLORIDE 20 MG/ML
1 SOLUTION OPHTHALMIC
Qty: 10 ML | Refills: 12 | Status: SHIPPED | OUTPATIENT
Start: 2025-07-28 | End: 2026-07-28

## 2025-09-03 DIAGNOSIS — G47.00 INSOMNIA, UNSPECIFIED TYPE: ICD-10-CM

## 2025-09-04 RX ORDER — TEMAZEPAM 15 MG/1
15 CAPSULE ORAL NIGHTLY PRN
Qty: 30 CAPSULE | Refills: 2 | Status: SHIPPED | OUTPATIENT
Start: 2025-09-04